# Patient Record
Sex: FEMALE | Race: BLACK OR AFRICAN AMERICAN | Employment: FULL TIME | URBAN - METROPOLITAN AREA
[De-identification: names, ages, dates, MRNs, and addresses within clinical notes are randomized per-mention and may not be internally consistent; named-entity substitution may affect disease eponyms.]

---

## 2019-04-29 ENCOUNTER — APPOINTMENT (OUTPATIENT)
Dept: CT IMAGING | Age: 34
End: 2019-04-29
Attending: PHYSICIAN ASSISTANT
Payer: COMMERCIAL

## 2019-04-29 ENCOUNTER — APPOINTMENT (OUTPATIENT)
Dept: GENERAL RADIOLOGY | Age: 34
End: 2019-04-29
Attending: EMERGENCY MEDICINE
Payer: COMMERCIAL

## 2019-04-29 ENCOUNTER — HOSPITAL ENCOUNTER (EMERGENCY)
Age: 34
Discharge: HOME | End: 2019-04-29
Attending: EMERGENCY MEDICINE
Payer: COMMERCIAL

## 2019-04-29 VITALS
DIASTOLIC BLOOD PRESSURE: 84 MMHG | OXYGEN SATURATION: 100 % | HEIGHT: 64 IN | BODY MASS INDEX: 33.08 KG/M2 | TEMPERATURE: 98.1 F | WEIGHT: 193.78 LBS | HEART RATE: 78 BPM | SYSTOLIC BLOOD PRESSURE: 128 MMHG | RESPIRATION RATE: 16 BRPM

## 2019-04-29 DIAGNOSIS — I30.9 ACUTE PERICARDITIS, UNSPECIFIED TYPE: Primary | ICD-10-CM

## 2019-04-29 DIAGNOSIS — R03.0 ELEVATED BLOOD PRESSURE READING: ICD-10-CM

## 2019-04-29 DIAGNOSIS — I10 ESSENTIAL HYPERTENSION: ICD-10-CM

## 2019-04-29 LAB
ALBUMIN SERPL-MCNC: 3.8 G/DL (ref 3.5–5)
ALBUMIN/GLOB SERPL: 0.9 {RATIO} (ref 1.1–2.2)
ALP SERPL-CCNC: 74 U/L (ref 45–117)
ALT SERPL-CCNC: 20 U/L (ref 12–78)
ANION GAP SERPL CALC-SCNC: 6 MMOL/L (ref 5–15)
AST SERPL-CCNC: 19 U/L (ref 15–37)
BASOPHILS # BLD: 0 K/UL (ref 0–0.1)
BASOPHILS NFR BLD: 0 % (ref 0–1)
BILIRUB SERPL-MCNC: 0.3 MG/DL (ref 0.2–1)
BUN SERPL-MCNC: 14 MG/DL (ref 6–20)
BUN/CREAT SERPL: 22 (ref 12–20)
CALCIUM SERPL-MCNC: 8.5 MG/DL (ref 8.5–10.1)
CHLORIDE SERPL-SCNC: 105 MMOL/L (ref 97–108)
CK SERPL-CCNC: 89 U/L (ref 26–192)
CO2 SERPL-SCNC: 27 MMOL/L (ref 21–32)
CREAT SERPL-MCNC: 0.64 MG/DL (ref 0.55–1.02)
DIFFERENTIAL METHOD BLD: ABNORMAL
EOSINOPHIL # BLD: 0.5 K/UL (ref 0–0.4)
EOSINOPHIL NFR BLD: 5 % (ref 0–7)
ERYTHROCYTE [DISTWIDTH] IN BLOOD BY AUTOMATED COUNT: 18.3 % (ref 11.5–14.5)
GLOBULIN SER CALC-MCNC: 4.4 G/DL (ref 2–4)
GLUCOSE SERPL-MCNC: 68 MG/DL (ref 65–100)
HCG UR QL: NEGATIVE
HCT VFR BLD AUTO: 34.7 % (ref 35–47)
HGB BLD-MCNC: 11.2 G/DL (ref 11.5–16)
IMM GRANULOCYTES # BLD AUTO: 0 K/UL (ref 0–0.04)
IMM GRANULOCYTES NFR BLD AUTO: 0 % (ref 0–0.5)
LYMPHOCYTES # BLD: 4.2 K/UL (ref 0.8–3.5)
LYMPHOCYTES NFR BLD: 42 % (ref 12–49)
MCH RBC QN AUTO: 23.8 PG (ref 26–34)
MCHC RBC AUTO-ENTMCNC: 32.3 G/DL (ref 30–36.5)
MCV RBC AUTO: 73.8 FL (ref 80–99)
MONOCYTES # BLD: 1.1 K/UL (ref 0–1)
MONOCYTES NFR BLD: 11 % (ref 5–13)
NEUTS SEG # BLD: 4.2 K/UL (ref 1.8–8)
NEUTS SEG NFR BLD: 42 % (ref 32–75)
NRBC # BLD: 0 K/UL (ref 0–0.01)
NRBC BLD-RTO: 0 PER 100 WBC
PLATELET # BLD AUTO: 386 K/UL (ref 150–400)
PMV BLD AUTO: 10.5 FL (ref 8.9–12.9)
POTASSIUM SERPL-SCNC: 3.8 MMOL/L (ref 3.5–5.1)
PROT SERPL-MCNC: 8.2 G/DL (ref 6.4–8.2)
RBC # BLD AUTO: 4.7 M/UL (ref 3.8–5.2)
SODIUM SERPL-SCNC: 138 MMOL/L (ref 136–145)
TROPONIN I SERPL-MCNC: <0.05 NG/ML
WBC # BLD AUTO: 10.1 K/UL (ref 3.6–11)

## 2019-04-29 PROCEDURE — 74011636320 HC RX REV CODE- 636/320: Performed by: EMERGENCY MEDICINE

## 2019-04-29 PROCEDURE — 71046 X-RAY EXAM CHEST 2 VIEWS: CPT

## 2019-04-29 PROCEDURE — 84484 ASSAY OF TROPONIN QUANT: CPT

## 2019-04-29 PROCEDURE — 96374 THER/PROPH/DIAG INJ IV PUSH: CPT

## 2019-04-29 PROCEDURE — 85025 COMPLETE CBC W/AUTO DIFF WBC: CPT

## 2019-04-29 PROCEDURE — 36415 COLL VENOUS BLD VENIPUNCTURE: CPT

## 2019-04-29 PROCEDURE — 80053 COMPREHEN METABOLIC PANEL: CPT

## 2019-04-29 PROCEDURE — 71275 CT ANGIOGRAPHY CHEST: CPT

## 2019-04-29 PROCEDURE — 81025 URINE PREGNANCY TEST: CPT

## 2019-04-29 PROCEDURE — 74011250636 HC RX REV CODE- 250/636: Performed by: PHYSICIAN ASSISTANT

## 2019-04-29 PROCEDURE — 93005 ELECTROCARDIOGRAM TRACING: CPT

## 2019-04-29 PROCEDURE — 82550 ASSAY OF CK (CPK): CPT

## 2019-04-29 PROCEDURE — 99285 EMERGENCY DEPT VISIT HI MDM: CPT

## 2019-04-29 RX ORDER — KETOROLAC TROMETHAMINE 30 MG/ML
15 INJECTION, SOLUTION INTRAMUSCULAR; INTRAVENOUS
Status: COMPLETED | OUTPATIENT
Start: 2019-04-29 | End: 2019-04-29

## 2019-04-29 RX ORDER — COLCHICINE 0.6 MG/1
0.6 TABLET ORAL DAILY
Qty: 30 TAB | Refills: 0 | Status: SHIPPED | OUTPATIENT
Start: 2019-04-29 | End: 2019-05-29

## 2019-04-29 RX ORDER — SODIUM CHLORIDE 0.9 % (FLUSH) 0.9 %
10 SYRINGE (ML) INJECTION
Status: COMPLETED | OUTPATIENT
Start: 2019-04-29 | End: 2019-04-29

## 2019-04-29 RX ORDER — PREDNISONE 10 MG/1
TABLET ORAL
Qty: 48 TAB | Refills: 0 | Status: SHIPPED | OUTPATIENT
Start: 2019-04-29 | End: 2019-05-17 | Stop reason: ALTCHOICE

## 2019-04-29 RX ADMIN — KETOROLAC TROMETHAMINE 15 MG: 30 INJECTION, SOLUTION INTRAMUSCULAR at 19:40

## 2019-04-29 RX ADMIN — Medication 10 ML: at 20:51

## 2019-04-29 RX ADMIN — IOPAMIDOL 100 ML: 755 INJECTION, SOLUTION INTRAVENOUS at 20:51

## 2019-04-29 NOTE — ED NOTES
Assumed care of patient. of this patient. She is alert and oriented x4 and placed on monitor x3. Patient presents to ED ambulatory with c/o chest pain for several days. Patient reports that she has hx of endocarditis 2 yrs ago and states the pain is similar. She recently noticed a lump in her left breast and was at [facility] to have evaluated when she informed her provider of the pain. Patient has hx of positive ANAs and is being followed by rhematology after having incident of endocarditis. She completed echo after diagnosis and was cleared by cardiology after treatment.

## 2019-04-29 NOTE — LETTER
[address] 
[facility] EMERGENCY DEPT 
[address] [place] [address] [address] [id number] 
[phone] Work/School Note Date: 4/29/2019 To Whom It May concern: 
 
Marcus Morrison was seen and treated today in the emergency room by the following provider(s): 
Attending Provider: Yaritza Etienne MD 
Physician Assistant: MEÑO Ventura. Marcus Morrison may return to work on 05/02/2019. Sincerely, 
 
 
 
 
Calvin Brown.  Dang Estrada

## 2019-04-30 LAB
ATRIAL RATE: 75 BPM
CALCULATED P AXIS, ECG09: 19 DEGREES
CALCULATED R AXIS, ECG10: 72 DEGREES
CALCULATED T AXIS, ECG11: 67 DEGREES
DIAGNOSIS, 93000: NORMAL
P-R INTERVAL, ECG05: 162 MS
Q-T INTERVAL, ECG07: 376 MS
QRS DURATION, ECG06: 68 MS
QTC CALCULATION (BEZET), ECG08: 419 MS
VENTRICULAR RATE, ECG03: 75 BPM

## 2019-04-30 NOTE — ED PROVIDER NOTES
EMERGENCY DEPARTMENT HISTORY AND PHYSICAL EXAM      Date: 4/29/2019  Patient Name: Deanne Núñez    History of Presenting Illness     Chief Complaint   Patient presents with    Chest Pain     pt reports she felt a lump in left breast, was seen by OB/GYN and is scheduled for a biopsy, reports she has been having pain in her chest x2-3 days, reports she had similar symptoms with a PE       History Provided By: Patient    HPI: Deanne Núñez, 28 y.o. female with PMHx significant for being DWAYNE positive, axillary adenopathy, hypertension, obesity, acute pericarditis and pericardial effusion in 2016, presents ambulatory to the ED with cc of central chest pain for the last 3 to 4 days. Patient states that her chest pain worsens with any sort of movement or coughing. She states that her chest pain is there all the time. She states that she experienced similar symptoms in 2016 was evaluated by cardiology. She was diagnosed with a pericardial effusion and pericarditis and was put on colchicine with cardiology follow-up. Today she went to [facility] [place] for evaluation of a lump in her left breast which she is scheduled to have a biopsy on and they encouraged her to follow-up in the ER for her chest pain given her past medical history. Patient attributed her chest pain to indigestion. She denies any prior viral symptoms, exposure to sick contacts, shortness of breath, nasal congestion, abdominal pain, nausea, vomiting, fever, chills, IV drug use. Patient states she does have an IUD that she is due to get out Monday. PCP: Shiloh SIU MD    There are no other complaints, changes, or physical findings at this time. Current Outpatient Medications   Medication Sig Dispense Refill    colchicine 0.6 mg tablet Take 1 Tab by mouth daily for 30 days. 30 Tab 0    predniSONE (STERAPRED DS) 10 mg dose pack Per dose pack instructions 48 Tab 0    LEVONORGESTREL (MIRENA IU) by IntraUTERine route.       multivitamin (ONE A DAY) tablet Take 1 Tab by mouth daily.  HYDROcodone-acetaminophen (NORCO) 5-325 mg per tablet Take  by mouth as needed for Pain.  ALPRAZolam (XANAX) 0.5 mg tablet Take  by mouth as needed for Anxiety. Past History     Past Medical History:  Past Medical History:   Diagnosis Date    DWAYNE positive     Axillary adenopathy 2/3/2016    Hypertension in pregnancy     Obesity     Pericardial effusion     Pericarditis, acute        Past Surgical History:  Past Surgical History:   Procedure Laterality Date    HX  SECTION         Family History:  Family History   Problem Relation Age of Onset    Hypertension Mother     Hypertension Father     Hypertension Brother        Social History:  Social History     Tobacco Use    Smoking status: Never Smoker    Smokeless tobacco: Never Used   Substance Use Topics    Alcohol use: Yes     Alcohol/week: 0.0 oz    Drug use: Not on file       Allergies:  No Known Allergies      Review of Systems   Review of Systems   Constitutional: Negative. Negative for activity change, appetite change, chills and fever. Eyes: Negative for pain and visual disturbance. Respiratory: Negative for cough, shortness of breath and wheezing. Cardiovascular: Positive for chest pain. Negative for palpitations and leg swelling. Gastrointestinal: Negative for abdominal pain, diarrhea, nausea and vomiting. Genitourinary: Negative for dysuria and hematuria. Musculoskeletal: Negative for arthralgias and myalgias. Skin: Negative for color change, rash and wound. Neurological: Negative for dizziness and headaches. All other systems reviewed and are negative. Physical Exam   Physical Exam   Constitutional: She is oriented to person, place, and time. Vital signs are normal. She appears well-developed and well-nourished. No distress.    28 y.o. female in NAD  Communicates appropriately and in full sentences  Normal vital signs   HENT:   Head: Normocephalic and atraumatic. Mouth/Throat: No oropharyngeal exudate. Eyes: Pupils are equal, round, and reactive to light. Conjunctivae are normal. Right eye exhibits no discharge. Left eye exhibits no discharge. Neck: Normal range of motion. Neck supple. No nuchal rigidity   Cardiovascular: Normal rate, regular rhythm and intact distal pulses. Pulmonary/Chest: Effort normal and breath sounds normal. No respiratory distress. She has no wheezes. Abdominal: Soft. Bowel sounds are normal. She exhibits no distension. There is no tenderness. There is no rebound and no guarding. Musculoskeletal: Normal range of motion. She exhibits no deformity. No neurologic or vascular compromise on exam.    Neurological: She is alert and oriented to person, place, and time. Skin: Skin is warm and dry. She is not diaphoretic. No erythema. Psychiatric: She has a normal mood and affect. Nursing note and vitals reviewed.         Diagnostic Study Results     Labs -     Recent Results (from the past 12 hour(s))   EKG, 12 LEAD, INITIAL    Collection Time: 04/29/19  5:12 PM   Result Value Ref Range    Ventricular Rate 75 BPM    Atrial Rate 75 BPM    P-R Interval 162 ms    QRS Duration 68 ms    Q-T Interval 376 ms    QTC Calculation (Bezet) 419 ms    Calculated P Axis 19 degrees    Calculated R Axis 72 degrees    Calculated T Axis 67 degrees    Diagnosis       Normal sinus rhythm  Low voltage QRS  Nonspecific T wave abnormality  No previous ECGs available     CBC WITH AUTOMATED DIFF    Collection Time: 04/29/19  5:34 PM   Result Value Ref Range    WBC 10.1 3.6 - 11.0 K/uL    RBC 4.70 3.80 - 5.20 M/uL    HGB 11.2 (L) 11.5 - 16.0 g/dL    HCT 34.7 (L) 35.0 - 47.0 %    MCV 73.8 (L) 80.0 - 99.0 FL    MCH 23.8 (L) 26.0 - 34.0 PG    MCHC 32.3 30.0 - 36.5 g/dL    RDW 18.3 (H) 11.5 - 14.5 %    PLATELET 305 457 - 092 K/uL    MPV 10.5 8.9 - 12.9 FL    NRBC 0.0 0  WBC    ABSOLUTE NRBC 0.00 0.00 - 0.01 K/uL    NEUTROPHILS 42 32 - 75 %    LYMPHOCYTES 42 12 - 49 %    MONOCYTES 11 5 - 13 %    EOSINOPHILS 5 0 - 7 %    BASOPHILS 0 0 - 1 %    IMMATURE GRANULOCYTES 0 0.0 - 0.5 %    ABS. NEUTROPHILS 4.2 1.8 - 8.0 K/UL    ABS. LYMPHOCYTES 4.2 (H) 0.8 - 3.5 K/UL    ABS. MONOCYTES 1.1 (H) 0.0 - 1.0 K/UL    ABS. EOSINOPHILS 0.5 (H) 0.0 - 0.4 K/UL    ABS. BASOPHILS 0.0 0.0 - 0.1 K/UL    ABS. IMM. GRANS. 0.0 0.00 - 0.04 K/UL    DF AUTOMATED     METABOLIC PANEL, COMPREHENSIVE    Collection Time: 04/29/19  5:34 PM   Result Value Ref Range    Sodium 138 136 - 145 mmol/L    Potassium 3.8 3.5 - 5.1 mmol/L    Chloride 105 97 - 108 mmol/L    CO2 27 21 - 32 mmol/L    Anion gap 6 5 - 15 mmol/L    Glucose 68 65 - 100 mg/dL    BUN 14 6 - 20 MG/DL    Creatinine 0.64 0.55 - 1.02 MG/DL    BUN/Creatinine ratio 22 (H) 12 - 20      GFR est AA >60 >60 ml/min/1.73m2    GFR est non-AA >60 >60 ml/min/1.73m2    Calcium 8.5 8.5 - 10.1 MG/DL    Bilirubin, total 0.3 0.2 - 1.0 MG/DL    ALT (SGPT) 20 12 - 78 U/L    AST (SGOT) 19 15 - 37 U/L    Alk. phosphatase 74 45 - 117 U/L    Protein, total 8.2 6.4 - 8.2 g/dL    Albumin 3.8 3.5 - 5.0 g/dL    Globulin 4.4 (H) 2.0 - 4.0 g/dL    A-G Ratio 0.9 (L) 1.1 - 2.2     CK W/ REFLX CKMB    Collection Time: 04/29/19  5:34 PM   Result Value Ref Range    CK 89 26 - 192 U/L   TROPONIN I    Collection Time: 04/29/19  5:34 PM   Result Value Ref Range    Troponin-I, Qt. <0.05 <0.05 ng/mL   HCG URINE, QL. - POC    Collection Time: 04/29/19  7:39 PM   Result Value Ref Range    Pregnancy test,urine (POC) NEGATIVE  NEG         Radiologic Studies -   CTA CHEST W OR W WO CONT   Final Result   IMPRESSION:    1. No pulmonary embolus. 2. Pericardial effusion. XR CHEST PA LAT   Final Result   IMPRESSION: Normal chest.           CT Results  (Last 48 hours)               04/29/19 2051  CTA CHEST W OR W WO CONT Final result    Impression:  IMPRESSION:    1. No pulmonary embolus. 2. Pericardial effusion.            Narrative:  EXAM: CT ANGIOGRAPHY CHEST    INDICATION: Chest pain for 2 to 3 days, acute, pulmonary embolism (PE)   suspected; Chest pain, normal EKG; Hx of pericardial effusion and pericarditis. COMPARISON: None. CONTRAST: 80 mL of Isovue-370. TECHNIQUE:    Precontrast  images were obtained to localize the volume for acquisition. Multislice helical CT arteriography was performed from the diaphragm to the   thoracic inlet during uneventful rapid bolus intravenous contrast   administration. Lung and soft tissue windows were generated. Coronal and   sagittal  reformatted images were also generated and 3D post processing   consisting of coronal maximum intensity projection images was performed. CT dose   reduction was achieved through use of a standardized protocol tailored for this   examination and automatic exposure control for dose modulation. FINDINGS:   LOWER NECK: The visualized portions of the thyroid and structures of the lower   neck are within normal limits. LUNGS: The lungs are clear of mass, nodule, airspace disease or edema. PLEURA: There is no pleural effusion or pneumothorax. PULMONARY ARTERIES: The pulmonary arteries are well enhanced and no pulmonary   emboli are identified. AORTA: The aorta enhances normally without evidence of aneurysm or dissection. MEDIASTINUM: There is a pericardial effusion. There is no mediastinal or hilar   adenopathy or mass. BONES AND SOFT TISSUES: The bones and soft tissues of the chest wall are within   normal limits. UPPER ABDOMEN: The visualized portions of the upper abdominal organs are normal.               CXR Results  (Last 48 hours)               04/29/19 1735  XR CHEST PA LAT Final result    Impression:  IMPRESSION: Normal chest.           Narrative:  EXAM:  XR CHEST PA LAT. INDICATION: Chest pain. COMPARISON: None. FINDINGS:    PA and lateral radiographs of the chest were obtained. Lungs:  The lungs are clear of mass, nodule, airspace disease or edema. Pleura: There is no pleural effusion or pneumothorax. Mediastinum: The cardiac and mediastinal contours and pulmonary vascularity are   normal.   Bones and soft tissues: The bones and soft tissues are within normal limits. The   patient is obese. Medical Decision Making   I am the first provider for this patient. I reviewed the vital signs, available nursing notes, past medical history, past surgical history, family history and social history. Vital Signs-Reviewed the patient's vital signs. Patient Vitals for the past 12 hrs:   Temp Pulse Resp BP SpO2   04/29/19 2130 -- 78 16 128/84 100 %   04/29/19 2105 -- -- -- (!) 140/102 --   04/29/19 1900 -- 90 17 (!) 137/93 100 %   04/29/19 1841 -- 86 10 (!) 144/111 100 %   04/29/19 1709 98.1 °F (36.7 °C) 81 16 (!) 151/93 100 %         Records Reviewed: Nursing Notes, Old Medical Records, Previous Radiology Studies and Previous Laboratory Studies    Provider Notes (Medical Decision Making):   Differential diagnosis includes pericardial effusion, pneumonia, PE, costochondritis, rheumatologic condition, bronchitis. 24-year-old patient with past medical history of unknown autoimmune versus rheumatologic condition that is currently being worked up by rheumatology presents ambulatory to the emergency department with complaints of central chest pain for the last 3 to 4 days that worsens with motion or coughing. Patient has a history of similar symptoms and was diagnosed with pericarditis and pericardial effusion that was treated with colchicine. Will evaluate with labs, EKG, x-ray, and CTA of chest. Discussed case with attending - pt is hemodynamically stable. Will discharge with colchicine and course of steroids as well as outpatient cardiology follow-up. Pt expresses understanding. Provided customary ED return precautions. ED Course:   Initial assessment performed.  The patients presenting problems have been discussed, and they are in agreement with the care plan formulated and outlined with them. I have encouraged them to ask questions as they arise throughout their visit. Pt noted to be feeling better. States he/she will follow up as instructed. All questions have been answered, pt voiced understanding and agreement with plan. Specific return precautions provided as well as instructions to return to the ED should sx worsen at any time. Vital signs stable for discharge. DISCHARGE NOTE:  [facility]'s  results have been reviewed with her. She has been counseled regarding her diagnosis. She verbally conveys understanding and agreement of the signs, symptoms, diagnosis, treatment and prognosis and additionally agrees to follow up as recommended with Sade Mills MD in 24 - 48 hours. She also agrees with the care-plan and conveys that all of her questions have been answered. I have also put together some discharge instructions for her that include: 1) educational information regarding their diagnosis, 2) how to care for their diagnosis at home, as well a 3) list of reasons why they would want to return to the ED prior to their follow-up appointment, should their condition change. She and/or family's questions have been answered. I have encouraged them to see the official results in [facility]\" or to retrieve the specifics of their results from medical records. PLAN:  1. Return precautions as discussed  2. Follow-up with providers as directed  3. Medications as prescribed    Return to ED if worse     Diagnosis     Clinical Impression:   1. Acute pericarditis, unspecified type    2. Elevated blood pressure reading    3. Essential hypertension        Discharge Medication List as of 4/29/2019  9:39 PM      START taking these medications    Details   colchicine 0.6 mg tablet Take 1 Tab by mouth daily for 30 days. , Normal, Disp-30 Tab, R-0      predniSONE (STERAPRED DS) 10 mg dose pack Per dose pack instructions, Normal, Disp-48 Tab, R-0         CONTINUE these medications which have NOT CHANGED    Details   LEVONORGESTREL (MIRENA IU) by IntraUTERine route., Historical Med      multivitamin (ONE A DAY) tablet Take 1 Tab by mouth daily. , Historical Med      HYDROcodone-acetaminophen (NORCO) 5-325 mg per tablet Take  by mouth as needed for Pain., Historical Med      ALPRAZolam (XANAX) 0.5 mg tablet Take  by mouth as needed for Anxiety. , Historical Med             Follow-up Information     Follow up With Specialties Details Why Contact Info    Sade Charles MD Internal Medicine Schedule an appointment as soon as possible for a visit in 2 days As needed, If symptoms worsen [address] [facility]  [phone]      [facility] EMERGENCY DEPT Emergency Medicine Go to If symptoms worsen [address] [facility] [address] Sissy Huggins MD Cardiology, Internal Medicine Call today  [phone]                This note will not be viewable in [address]    1:22 PM  I was personally available for consultation in the emergency department. I have reviewed the chart and agree with the documentation recorded by the [facility], including the assessment, treatment plan, and disposition.   Indira Mishra MD

## 2019-04-30 NOTE — ED NOTES
Patient discharged by [facility]. Patient provided with discharge instructions Rx and instructions on follow up care. Patient out of ED ambulatory accompanied by self.

## 2019-04-30 NOTE — DISCHARGE INSTRUCTIONS
Patient Education        Elevated Blood Pressure: Care Instructions  Your Care Instructions    Blood pressure is a measure of how hard the blood pushes against the walls of your arteries. It's normal for blood pressure to go up and down throughout the day. But if it stays up over time, you have high blood pressure. Two numbers tell you your blood pressure. The first number is the systolic pressure. It shows how hard the blood pushes when your heart is pumping. The second number is the diastolic pressure. It shows how hard the blood pushes between heartbeats, when your heart is relaxed and filling with blood. An ideal blood pressure in adults is less than 120/80 (say \"120 over 80\"). High blood pressure is 140/90 or higher. You have high blood pressure if your top number is 140 or higher or your bottom number is 90 or higher, or both. The main test for high blood pressure is simple, fast, and painless. To diagnose high blood pressure, your doctor will test your blood pressure at different times. After testing your blood pressure, your doctor may ask you to test it again when you are home. If you are diagnosed with high blood pressure, you can work with your doctor to make a long-term plan to manage it. Follow-up care is a key part of your treatment and safety. Be sure to make and go to all appointments, and call your doctor if you are having problems. It's also a good idea to know your test results and keep a list of the medicines you take. How can you care for yourself at home? · Do not smoke. Smoking increases your risk for heart attack and stroke. If you need help quitting, talk to your doctor about stop-smoking programs and medicines. These can increase your chances of quitting for good. · Stay at a healthy weight. · Try to limit how much sodium you eat to less than 2,300 milligrams (mg) a day. Your doctor may ask you to try to eat less than 1,500 mg a day. · Be physically active.  Get at least 30 minutes of exercise on most days of the week. Walking is a good choice. You also may want to do other activities, such as running, swimming, cycling, or playing tennis or team sports. · Avoid or limit alcohol. Talk to your doctor about whether you can drink any alcohol. · Eat plenty of fruits, vegetables, and low-fat dairy products. Eat less saturated and total fats. · Learn how to check your blood pressure at home. When should you call for help? Call your doctor now or seek immediate medical care if:  ? · Your blood pressure is much higher than normal (such as 180/110 or higher). ? · You think high blood pressure is causing symptoms such as:  ¨ Severe headache. ¨ Blurry vision. ? Watch closely for changes in your health, and be sure to contact your doctor if:  ? · You do not get better as expected. Where can you learn more? Go to http://elviaEGIDIUM Technologiesanne.info/. Enter K891 in the search box to learn more about \"Elevated Blood Pressure: Care Instructions. \"  Current as of: September 21, 2016  Content Version: 11.4  © 8167-7694 [employer]. Care instructions adapted under license by [employer] (which disclaims liability or warranty for this information). If you have questions about a medical condition or this instruction, always ask your healthcare professional. [address] any warranty or liability for your use of this information. Patient Education        DASH Diet: Care Instructions  Your Care Instructions    The DASH diet is an eating plan that can help lower your blood pressure. DASH stands for Dietary Approaches to Stop Hypertension. Hypertension is high blood pressure. The DASH diet focuses on eating foods that are high in calcium, potassium, and magnesium. These nutrients can lower blood pressure. The foods that are highest in these nutrients are fruits, vegetables, low-fat dairy products, nuts, seeds, and legumes.  But taking calcium, potassium, and magnesium supplements instead of eating foods that are high in those nutrients does not have the same effect. The DASH diet also includes whole grains, fish, and poultry. The DASH diet is one of several lifestyle changes your doctor may recommend to lower your high blood pressure. Your doctor may also want you to decrease the amount of sodium in your diet. Lowering sodium while following the DASH diet can lower blood pressure even further than just the DASH diet alone. Follow-up care is a key part of your treatment and safety. Be sure to make and go to all appointments, and call your doctor if you are having problems. It's also a good idea to know your test results and keep a list of the medicines you take. How can you care for yourself at home? Following the DASH diet  · Eat 4 to 5 servings of fruit each day. A serving is 1 medium-sized piece of fruit, ½ cup chopped or canned fruit, 1/4 cup dried fruit, or 4 ounces (½ cup) of fruit juice. Choose fruit more often than fruit juice. · Eat 4 to 5 servings of vegetables each day. A serving is 1 cup of lettuce or raw leafy vegetables, ½ cup of chopped or cooked vegetables, or 4 ounces (½ cup) of vegetable juice. Choose vegetables more often than vegetable juice. · Get 2 to 3 servings of low-fat and fat-free dairy each day. A serving is 8 ounces of milk, 1 cup of yogurt, or 1 ½ ounces of cheese. · Eat 6 to 8 servings of grains each day. A serving is 1 slice of bread, 1 ounce of dry cereal, or ½ cup of cooked rice, pasta, or cooked cereal. Try to choose whole-grain products as much as possible. · Limit lean meat, poultry, and fish to 2 servings each day. A serving is 3 ounces, about the size of a deck of cards. · Eat 4 to 5 servings of nuts, seeds, and legumes (cooked dried beans, lentils, and split peas) each week. A serving is 1/3 cup of nuts, 2 tablespoons of seeds, or ½ cup of cooked beans or peas.   · Limit fats and oils to 2 to 3 servings each day. A serving is 1 teaspoon of vegetable oil or 2 tablespoons of salad dressing. · Limit sweets and added sugars to 5 servings or less a week. A serving is 1 tablespoon jelly or jam, ½ cup sorbet, or 1 cup of lemonade. · Eat less than 2,300 milligrams (mg) of sodium a day. If you limit your sodium to 1,500 mg a day, you can lower your blood pressure even more. Tips for success  · Start small. Do not try to make dramatic changes to your diet all at once. You might feel that you are missing out on your favorite foods and then be more likely to not follow the plan. Make small changes, and stick with them. Once those changes become habit, add a few more changes. · Try some of the following:  ? Make it a goal to eat a fruit or vegetable at every meal and at snacks. This will make it easy to get the recommended amount of fruits and vegetables each day. ? Try yogurt topped with fruit and nuts for a snack or healthy dessert. ? Add lettuce, tomato, cucumber, and onion to sandwiches. ? Combine a ready-made pizza crust with low-fat mozzarella cheese and lots of vegetable toppings. Try using tomatoes, squash, spinach, broccoli, carrots, cauliflower, and onions. ? Have a variety of cut-up vegetables with a low-fat dip as an appetizer instead of chips and dip. ? Sprinkle sunflower seeds or chopped almonds over salads. Or try adding chopped walnuts or almonds to cooked vegetables. ? Try some vegetarian meals using beans and peas. Add garbanzo or kidney beans to salads. Make burritos and tacos with mashed meek beans or black beans. Where can you learn more? Go to http://elvia-anne.info/. Enter Z125 in the search box to learn more about \"DASH Diet: Care Instructions. \"  Current as of: July 22, 2018  Content Version: 11.9  © 0093-2147 [employer]. Care instructions adapted under license by [employer] (which disclaims liability or warranty for this information).  If you have questions about a medical condition or this instruction, always ask your healthcare professional. [address] any warranty or liability for your use of this information. Patient Education        High Blood Pressure: Care Instructions  Overview    It's normal for blood pressure to go up and down throughout the day. But if it stays up, you have high blood pressure. Another name for high blood pressure is hypertension. Despite what a lot of people think, high blood pressure usually doesn't cause headaches or make you feel dizzy or lightheaded. It usually has no symptoms. But it does increase your risk of stroke, heart attack, and other problems. You and your doctor will talk about your risks of these problems based on your blood pressure. Your doctor will give you a goal for your blood pressure. Your goal will be based on your health and your age. Lifestyle changes, such as eating healthy and being active, are always important to help lower blood pressure. You might also take medicine to reach your blood pressure goal.  Follow-up care is a key part of your treatment and safety. Be sure to make and go to all appointments, and call your doctor if you are having problems. It's also a good idea to know your test results and keep a list of the medicines you take. How can you care for yourself at home? Medical treatment  · If you stop taking your medicine, your blood pressure will go back up. You may take one or more types of medicine to lower your blood pressure. Be safe with medicines. Take your medicine exactly as prescribed. Call your doctor if you think you are having a problem with your medicine. · Talk to your doctor before you start taking aspirin every day. Aspirin can help certain people lower their risk of a heart attack or stroke. But taking aspirin isn't right for everyone, because it can cause serious bleeding. · See your doctor regularly.  You may need to see the doctor more often at first or until your blood pressure comes down. · If you are taking blood pressure medicine, talk to your doctor before you take decongestants or anti-inflammatory medicine, such as ibuprofen. Some of these medicines can raise blood pressure. · Learn how to check your blood pressure at home. Lifestyle changes  · Stay at a healthy weight. This is especially important if you put on weight around the waist. Losing even 10 pounds can help you lower your blood pressure. · If your doctor recommends it, get more exercise. Walking is a good choice. Bit by bit, increase the amount you walk every day. Try for at least 30 minutes on most days of the week. You also may want to swim, bike, or do other activities. · Avoid or limit alcohol. Talk to your doctor about whether you can drink any alcohol. · Try to limit how much sodium you eat to less than 2,300 milligrams (mg) a day. Your doctor may ask you to try to eat less than 1,500 mg a day. · Eat plenty of fruits (such as bananas and oranges), vegetables, legumes, whole grains, and low-fat dairy products. · Lower the amount of saturated fat in your diet. Saturated fat is found in animal products such as milk, cheese, and meat. Limiting these foods may help you lose weight and also lower your risk for heart disease. · Do not smoke. Smoking increases your risk for heart attack and stroke. If you need help quitting, talk to your doctor about stop-smoking programs and medicines. These can increase your chances of quitting for good. When should you call for help? Call 911 anytime you think you may need emergency care. This may mean having symptoms that suggest that your blood pressure is causing a serious heart or blood vessel problem. Your blood pressure may be over 180/120.   For example, call 911 if:    · You have symptoms of a heart attack. These may include:  ? Chest pain or pressure, or a strange feeling in the chest.  ? Sweating. ?  Shortness of breath. ? Nausea or vomiting. ? Pain, pressure, or a strange feeling in the back, neck, jaw, or upper belly or in one or both shoulders or arms. ? Lightheadedness or sudden weakness. ? A fast or irregular heartbeat.     · You have symptoms of a stroke. These may include:  ? Sudden numbness, tingling, weakness, or loss of movement in your face, arm, or leg, especially on only one side of your body. ? Sudden vision changes. ? Sudden trouble speaking. ? Sudden confusion or trouble understanding simple statements. ? Sudden problems with walking or balance. ? A sudden, severe headache that is different from past headaches.     · You have severe back or belly pain.    Do not wait until your blood pressure comes down on its own. Get help right away.   Call your doctor now or seek immediate care if:    · Your blood pressure is much higher than normal (such as 180/120 or higher), but you don't have symptoms.     · You think high blood pressure is causing symptoms, such as:  ? Severe headache.  ? Blurry vision.    Watch closely for changes in your health, and be sure to contact your doctor if:    · Your blood pressure measures higher than your doctor recommends at least 2 times. That means the top number is higher or the bottom number is higher, or both.     · You think you may be having side effects from your blood pressure medicine. Where can you learn more? Go to http://elvia-anne.info/. Enter M875 in the search box to learn more about \"High Blood Pressure: Care Instructions. \"  Current as of: July 22, 2018  Content Version: 11.9  © 9606-5194 [employer]. Care instructions adapted under license by [employer] (which disclaims liability or warranty for this information).  If you have questions about a medical condition or this instruction, always ask your healthcare professional. [place] [address] any warranty or liability for your use of this information. Patient Education        Pericarditis: Care Instructions  Your Care Instructions    Pericarditis occurs when the membrane that surrounds the heart and its major blood vessels becomes inflamed. In most cases, the cause is not known. It can be caused by a virus, a heart attack, or a chest injury. It also can be caused by another type of illness. Pericarditis causes sharp chest pain. This pain gets worse when you lie down or take a deep breath. The pain gets better if you lean forward or sit up. Pericarditis often heals on its own. It usually does not cause any more problems. Most people get better within a couple of weeks. Follow-up care is a key part of your treatment and safety. Be sure to make and go to all appointments, and call your doctor if you are having problems. It's also a good idea to know your test results and keep a list of the medicines you take. How can you care for yourself at home? · Watch for the return of your symptoms. Sometimes pericarditis can come back after it has gone away. · Be safe with medicines. Take your medicines exactly as prescribed. Call your doctor if you think you are having a problem with your medicine. · Ask your doctor if you can take an over-the-counter pain medicine, such as acetaminophen (Tylenol), ibuprofen (Advil, Motrin), or naproxen (Aleve). Read and follow all instructions on the label. · Do not take two or more pain medicines at the same time unless the doctor told you to. Many pain medicines have acetaminophen, which is Tylenol. Too much acetaminophen (Tylenol) can be harmful. · Get plenty of rest until you feel better, especially if you have a fever. · Avoid exercise and strenuous activity that has not been approved by your doctor. Ask your doctor when you can be active again. When should you call for help? Call 911 anytime you think you may need emergency care.  For example, call if:    · You have severe trouble breathing.     · You passed out (lost consciousness).    Call your doctor now or seek immediate medical care if:    · You are dizzy or lightheaded, or you feel like you may faint.     · You have trouble breathing.     · You have a new or higher fever.    Watch closely for changes in your health, and be sure to contact your doctor if:    · You do not get better as expected. Where can you learn more? Go to http://elvia-anne.info/. Enter 266 0692 in the search box to learn more about \"Pericarditis: Care Instructions. \"  Current as of: July 22, 2018  Content Version: 11.9  © 7586-7117 [employer]). Care instructions adapted under license by [facility] (which disclaims liability or warranty for this information). If you have questions about a medical condition or this instruction, always ask your healthcare professional. [address] any warranty or liability for your use of this information.

## 2019-05-17 ENCOUNTER — OFFICE VISIT (OUTPATIENT)
Dept: CARDIOLOGY CLINIC | Age: 34
End: 2019-05-17

## 2019-05-17 VITALS
RESPIRATION RATE: 16 BRPM | SYSTOLIC BLOOD PRESSURE: 134 MMHG | OXYGEN SATURATION: 96 % | DIASTOLIC BLOOD PRESSURE: 82 MMHG | HEART RATE: 86 BPM | WEIGHT: 191 LBS | BODY MASS INDEX: 32.61 KG/M2 | HEIGHT: 64 IN

## 2019-05-17 DIAGNOSIS — I31.9 PERICARDITIS WITH EFFUSION: Primary | ICD-10-CM

## 2019-05-17 RX ORDER — HYDROXYCHLOROQUINE SULFATE 200 MG/1
200 TABLET, FILM COATED ORAL 2 TIMES DAILY
Refills: 1 | COMMUNITY
Start: 2019-05-03

## 2019-05-17 RX ORDER — CHOLECALCIFEROL (VITAMIN D3) 125 MCG
5000 CAPSULE ORAL DAILY
COMMUNITY

## 2019-05-17 NOTE — PROGRESS NOTES
Pablo Hill is a 28 y.o. female is here for cardiac evaluation--sx of chest pain. PMHx significant for being DWAYNE positive, axillary adenopathy, hypertension, obesity, acute pericarditis and pericardial effusion in 2016, presents ambulatory to the ED with cc of central chest pain. Patient states that her chest pain worsens with any sort of movement or coughing. She states that her chest pain is there all the time. She states that she experienced similar symptoms in 2016 and had seen previously by me at that time. She was diagnosed with a pericardial effusion and pericarditis (idiopathic, ?viral) and was put on colchicine with  follow-up and resolution. Recently  went to [facility] [place] for evaluation of a lump in her left breast which she is scheduled to have a biopsy. Had chest pain--more indigestion/GERD type sx and seen in ER--w/u in ER with chest CTA--small pericardial effusion present, otherwise neg. rx'd colchicine, sx have completely resolved. Has f/u appt with her Rheumatologist. The patient denies current chest pain/ shortness of breath, orthopnea, PND, LE edema, palpitations, syncope, presyncope or fatigue.        Patient Active Problem List    Diagnosis Date Noted    Axillary adenopathy, left 2016    Lymphadenopathy 10/28/2015    Pericarditis with effusion 10/16/2015    Gestational HTN 10/16/2015    Obesity 10/16/2015    DWAYNE positive 10/16/2015      Sade Charles MD  Past Medical History:   Diagnosis Date    DWAYNE positive     Axillary adenopathy 2/3/2016    Hypertension in pregnancy     Obesity     Pericardial effusion     Pericarditis, acute       Past Surgical History:   Procedure Laterality Date    HX  SECTION       No Known Allergies   Family History   Problem Relation Age of Onset    Hypertension Mother     Hypertension Father     Hypertension Brother       Social History     Socioeconomic History    Marital status: SINGLE     Spouse name: Not on file    Number of children: Not on file    Years of education: Not on file    Highest education level: Not on file   Occupational History    Not on file   Social Needs    Financial resource strain: Not on file    Food insecurity:     Worry: Not on file     Inability: Not on file    Transportation needs:     Medical: Not on file     Non-medical: Not on file   Tobacco Use    Smoking status: Never Smoker    Smokeless tobacco: Never Used   Substance and Sexual Activity    Alcohol use: Yes     Alcohol/week: 0.0 oz    Drug use: Not on file    Sexual activity: Not on file   Lifestyle    Physical activity:     Days per week: Not on file     Minutes per session: Not on file    Stress: Not on file   Relationships    Social connections:     Talks on phone: Not on file     Gets together: Not on file     Attends Jew service: Not on file     Active member of club or organization: Not on file     Attends meetings of clubs or organizations: Not on file     Relationship status: Not on file    Intimate partner violence:     Fear of current or ex partner: Not on file     Emotionally abused: Not on file     Physically abused: Not on file     Forced sexual activity: Not on file   Other Topics Concern    Not on file   Social History Narrative    Not on file      Current Outpatient Medications   Medication Sig    hydroxychloroquine (PLAQUENIL) 200 mg tablet Take 200 mg by mouth two (2) times a day.  cholecalciferol, vitamin D3, (VITAMIN D3) 2,000 unit tab Take 5,000 Units by mouth daily.  colchicine 0.6 mg tablet Take 1 Tab by mouth daily for 30 days.  LEVONORGESTREL (MIRENA IU) by IntraUTERine route.  multivitamin (ONE A DAY) tablet Take 1 Tab by mouth daily.  HYDROcodone-acetaminophen (NORCO) 5-325 mg per tablet Take  by mouth as needed for Pain.  ALPRAZolam (XANAX) 0.5 mg tablet Take  by mouth as needed for Anxiety. No current facility-administered medications for this visit. Review of Symptoms:    CONST  No weight change. No fever, chills, sweats    ENT No visual changes, URI sx, sore throat    CV  See HPI   RESP  No cough, or sputum, wheezing. Also see HPI   GI  No abdominal pain or change in bowel habits. No heartburn or dysphagia. No melena or rectal bleeding.   No dysuria, urgency, frequency, hematuria   MSKEL  No joint pain, swelling. No muscle pain. SKIN  No rash or lesions. NEURO  No headache, syncope, or seizure. No weakness, loss of sensation, or paresthesias. PSYCH  No low mood or depression  No anxiety. HE/LYMPH  No easy bruising, abnormal bleeding, or enlarged glands. Physical ExamPhysical Exam:    Visit Vitals  /82 (BP 1 Location: Right arm, BP Patient Position: Sitting)   Pulse 86   Resp 16   Ht 5' 4\" (1.626 m)   Wt 191 lb (86.6 kg)   SpO2 96%   BMI 32.79 kg/m²     Gen: NAD  HEENT:  PERRL, throat clear  Neck: no adenopathy, no thyromegaly, no JVD   Heart:  Regular,Nl S1S2,  no murmur, gallop or rub.   Lungs:  clear  Abdomen:   Soft, non-tender, bowel sounds are active.   Extremities:  No edema  Pulse: symmetric  Neuro: A&O times 3, No focal neuro deficits    Cardiographics    ECG: NSR, PRWP, NST, no acute changes    Labs:   Lab Results   Component Value Date/Time    Sodium 138 04/29/2019 05:34 PM    Potassium 3.8 04/29/2019 05:34 PM    Chloride 105 04/29/2019 05:34 PM    CO2 27 04/29/2019 05:34 PM    Anion gap 6 04/29/2019 05:34 PM    Glucose 68 04/29/2019 05:34 PM    BUN 14 04/29/2019 05:34 PM    Creatinine 0.64 04/29/2019 05:34 PM    BUN/Creatinine ratio 22 (H) 04/29/2019 05:34 PM    GFR est AA >60 04/29/2019 05:34 PM    GFR est non-AA >60 04/29/2019 05:34 PM    Calcium 8.5 04/29/2019 05:34 PM    Bilirubin, total 0.3 04/29/2019 05:34 PM    AST (SGOT) 19 04/29/2019 05:34 PM    Alk.  phosphatase 74 04/29/2019 05:34 PM    Protein, total 8.2 04/29/2019 05:34 PM    Albumin 3.8 04/29/2019 05:34 PM    Globulin 4.4 (H) 04/29/2019 05:34 PM A-G Ratio 0.9 (L) 04/29/2019 05:34 PM    ALT (SGPT) 20 04/29/2019 05:34 PM     No results found for: CPK, CPKX, CPX  No results found for: CHOL, CHOLX, CHLST, CHOLV, 189056, HDL, LDL, LDLC, DLDLP, TGLX, TRIGL, TRIGP, CHHD, CHHDX  No results found for this or any previous visit. Assessment:         Patient Active Problem List    Diagnosis Date Noted    Axillary adenopathy, left 02/03/2016    Lymphadenopathy 10/28/2015    Pericarditis with effusion 10/16/2015    Gestational HTN 10/16/2015    Obesity 10/16/2015    DWAYNE positive 10/16/2015     28 y.o. female is here for cardiac evaluation--sx of chest pain. PMHx significant for being DWAYNE positive, axillary adenopathy, hypertension, obesity, acute pericarditis and pericardial effusion in 2016, presents ambulatory to the ED with cc of central chest pain. Patient states that her chest pain worsens with any sort of movement or coughing. She states that her chest pain is there all the time. She states that she experienced similar symptoms in 2016 and had seen previously by me at that time. She was diagnosed with a pericardial effusion and pericarditis (idiopathic, ?viral) and was put on colchicine with  follow-up and resolution. Recently  went to [facility]'s [place] for evaluation of a lump in her left breast which she is scheduled to have a biopsy. Had chest pain--more indigestion/GERD type sx and seen in ER--w/u in ER with chest CTA--small pericardial effusion present, otherwise neg. rx'd colchicine, sx have completely resolved.   Has f/u appt with her Rheumatologist.      Plan:     Finish colchicine rx  Echo/doppler--call w/ results  F/u with PCP and Rheumatology as planned    Pallavi Burr MD

## 2019-05-17 NOTE — PROGRESS NOTES
1. Have you been to the ER, urgent care clinic since your last visit? Hospitalized since your last visit? [facility].     2. Have you seen or consulted any other health care providers outside of the [address] [place] [address] since your last visit? Include any pap smears or colon screening. YES, [facility]. [address]

## 2019-06-07 ENCOUNTER — TELEPHONE (OUTPATIENT)
Dept: CARDIOLOGY CLINIC | Age: 34
End: 2019-06-07

## 2019-06-07 NOTE — TELEPHONE ENCOUNTER
Message left on office answering machine.     Please call patient she is starting to have pain again like she has fluid wants to know if she should start taking     Colchicine     [phone]    Thanks  Jovani Burns

## 2019-06-07 NOTE — TELEPHONE ENCOUNTER
Advised that she should go to the ER if chest pain is worsening. Echocardiogram will give Dr. Letitia Harris more information to go by (scheduled for 6/11)  Advised that she proceed with that study.

## 2019-06-11 ENCOUNTER — TELEPHONE (OUTPATIENT)
Dept: CARDIOLOGY CLINIC | Age: 34
End: 2019-06-11

## 2019-06-11 NOTE — TELEPHONE ENCOUNTER
----- Message from Bandar Juarez MD sent at 6/11/2019 12:49 PM EDT -----  Regarding: echo  Advise echo shows very small amount of pericardial fluid--no concerns (finishing rx for pericarditis); echo otherwise ok--normal LV pumping function, valves ok.   Thanks [facility]

## 2019-06-12 RX ORDER — COLCHICINE 0.6 MG/1
0.6 TABLET ORAL DAILY
Qty: 30 TAB | Refills: 2 | Status: SHIPPED | OUTPATIENT
Start: 2019-06-12 | End: 2022-07-14

## 2019-06-12 NOTE — TELEPHONE ENCOUNTER
Per Dr. Lucio Chand:  echo shows very small amount of pericardial fluid--no concerns. echo otherwise ok--normal LV pumping function, valves ok. If still having sx, favor colchicine 0.6mg every day for 3 mos (longer rx to prevent recurrence). Should also discuss with Rheumatology. [facility]     Verbal order per Dr. Lucio Chand. Order (medication, dose, route, frequency, amount, refills) repeated and verified twice. Spoke with the patient. Verified patient with two patient identifiers. Results given and questions answered. Pt asked that the RX be sent but she will call the Rheumatologist before picking up the rx. Pt reports the the pain is subsiding.   Patient verbalized understanding.    (CALLED IN THE RX DUE TO E-SCRIBE ERROR.)

## 2019-07-01 ENCOUNTER — TELEPHONE (OUTPATIENT)
Dept: CARDIOLOGY CLINIC | Age: 34
End: 2019-07-01

## 2019-07-01 NOTE — TELEPHONE ENCOUNTER
Patient needs cardiac clearance for a Tubal Ligation procedure being done by Dr. Villalba Doctor.   Please call [phone]

## 2019-07-02 NOTE — TELEPHONE ENCOUNTER
Per Dr. Stephanie Berkowitz:          Carla Trejo for planned procedure at low cardiac risk. [facility]     L/M for the pt regarding.

## 2020-03-16 ENCOUNTER — OFFICE VISIT (OUTPATIENT)
Dept: CARDIOLOGY CLINIC | Age: 35
End: 2020-03-16

## 2020-03-16 VITALS
DIASTOLIC BLOOD PRESSURE: 88 MMHG | HEART RATE: 78 BPM | SYSTOLIC BLOOD PRESSURE: 126 MMHG | TEMPERATURE: 98.1 F | BODY MASS INDEX: 34.15 KG/M2 | HEIGHT: 64 IN | RESPIRATION RATE: 14 BRPM | WEIGHT: 200 LBS | OXYGEN SATURATION: 100 %

## 2020-03-16 DIAGNOSIS — I31.9 PERICARDITIS WITH EFFUSION: Primary | ICD-10-CM

## 2020-03-16 DIAGNOSIS — R76.8 ANA POSITIVE: ICD-10-CM

## 2020-03-16 DIAGNOSIS — M35.00 SJOGREN'S SYNDROME, WITH UNSPECIFIED ORGAN INVOLVEMENT (HCC): ICD-10-CM

## 2020-03-16 NOTE — LETTER
3/16/20 Patient: Pankaj Aburto YOB: 1985 Date of Visit: 3/16/2020 Jovanna Mcgee MD 
[address] [facility] [address] [id number] VIA Facsimile: [phone] Dear Jovanna Mcgee MD, Thank you for referring Ms. Pankaj Aburto to [facility] for evaluation. My notes for this consultation are attached. If you have questions, please do not hesitate to call me. I look forward to following your patient along with you.  
 
 
Sincerely, 
 
Jose Deluca MD

## 2020-03-16 NOTE — PROGRESS NOTES
Verified patient with two patient identifiers. Medications reviewed/approved by Dr. Shaye Hawley. 1. Have you been to the ER, urgent care clinic since your last visit? Hospitalized since your last visit?no    2. Have you seen or consulted any other health care providers outside of the [address] [place] [address] since your last visit? Include any pap smears or colon screening. Yes, Dr. Shon Morrison - seen since last visit for Pericardial Effusion.  Dr. Ro Bailey - rheumatologist.

## 2020-03-16 NOTE — PATIENT INSTRUCTIONS
Continue indomethacin 25mg three times a day for total 10 days, then taper to 25 twice a day x 5 days, then 12.5 mg twice daily  x 5 days, then d/c.

## 2020-03-16 NOTE — PROGRESS NOTES
Gwendolyn Turner is a 27 y.o. female is here for symptom-based f/u--right anterior chest pain. PMHx significant for being DWAYNE positive, Sjogren's, axillary adenopathy, hypertension, obesity, acute pericarditis and pericardial effusion in , previously rx'd colchicine. Seen 3/13/20 by Devin Lovell NP, EKG with NSR, NST, no acute changes. Repeat Chest CTA 3/13/20 with no evidence pulm embolus, mod sized pericardial effusion UNCHANGED from previous. Has f/u planned with Rheumatology. Rx'd Indomethacin 25mg tid--sx improved considerably. No F, chills, cough/sputum, hemoptysis, dyspnea. The patient denies shortness of breath, orthopnea, PND, LE edema, palpitations, syncope, presyncope or fatigue.        Patient Active Problem List    Diagnosis Date Noted    Sjogren's syndrome ([address] 2020    Axillary adenopathy, left 2016    Lymphadenopathy 10/28/2015    Pericarditis with effusion 10/16/2015    Gestational HTN 10/16/2015    Obesity 10/16/2015    DWAYNE positive 10/16/2015      Sade Charles MD  Past Medical History:   Diagnosis Date    DWAYNE positive     Axillary adenopathy 2/3/2016    Hypertension in pregnancy     Obesity     Pericardial effusion     Pericarditis, acute       Past Surgical History:   Procedure Laterality Date    HX  SECTION       No Known Allergies   Family History   Problem Relation Age of Onset    Hypertension Mother     Hypertension Father     Hypertension Brother       Social History     Socioeconomic History    Marital status: SINGLE     Spouse name: Not on file    Number of children: Not on file    Years of education: Not on file    Highest education level: Not on file   Occupational History    Not on file   Social Needs    Financial resource strain: Not on file    Food insecurity     Worry: Not on file     Inability: Not on file    Transportation needs     Medical: Not on file     Non-medical: Not on file   Tobacco Use    Smoking status: Never Smoker    Smokeless tobacco: Never Used   Substance and Sexual Activity    Alcohol use: Yes     Alcohol/week: 0.0 standard drinks    Drug use: Not on file    Sexual activity: Not on file   Lifestyle    Physical activity     Days per week: Not on file     Minutes per session: Not on file    Stress: Not on file   Relationships    Social connections     Talks on phone: Not on file     Gets together: Not on file     Attends [employer] service: Not on file     Active member of club or organization: Not on file     Attends meetings of clubs or organizations: Not on file     Relationship status: Not on file    Intimate partner violence     Fear of current or ex partner: Not on file     Emotionally abused: Not on file     Physically abused: Not on file     Forced sexual activity: Not on file   Other Topics Concern    Not on file   Social History Narrative    Not on file      Current Outpatient Medications   Medication Sig    INDOMETHACIN PO Take  by mouth three (3) times daily.  hydroxychloroquine (PLAQUENIL) 200 mg tablet Take 200 mg by mouth two (2) times a day.  cholecalciferol, vitamin D3, (VITAMIN D3) 2,000 unit tab Take 5,000 Units by mouth daily.  LEVONORGESTREL (MIRENA IU) by IntraUTERine route.  multivitamin (ONE A DAY) tablet Take 1 Tab by mouth daily.  colchicine 0.6 mg tablet Take 1 Tab by mouth daily.  HYDROcodone-acetaminophen (NORCO) 5-325 mg per tablet Take  by mouth as needed for Pain.  ALPRAZolam (XANAX) 0.5 mg tablet Take  by mouth as needed for Anxiety. No current facility-administered medications for this visit. Review of Symptoms:    CONST  No weight change. No fever, chills, sweats    ENT No visual changes, URI sx, sore throat    CV  See HPI   RESP  No cough, or sputum, wheezing. Also see HPI   GI  No abdominal pain or change in bowel habits. No heartburn or dysphagia. No melena or rectal bleeding.       No dysuria, urgency, frequency, hematuria   MSKEL No joint pain, swelling. No muscle pain. SKIN  No rash or lesions. NEURO  No headache, syncope, or seizure. No weakness, loss of sensation, or paresthesias. PSYCH  No low mood or depression  No anxiety. HE/LYMPH  No easy bruising, abnormal bleeding, or enlarged glands. Physical ExamPhysical Exam:    Visit Vitals  /88 (BP 1 Location: Right arm, BP Patient Position: Sitting)   Pulse 78   Temp 98.1 °F (36.7 °C)   Resp 14   Ht 5' 4\" (1.626 m)   Wt 200 lb (90.7 kg)   SpO2 100% Comment: ra   BMI 34.33 kg/m²     Gen: NAD  HEENT:  PERRL, throat clear  Neck: no adenopathy, no thyromegaly, no JVD   Heart:  Regular,Nl S1S2,  no murmur, gallop or rub.   Lungs:  clear  Abdomen:   Soft, non-tender, bowel sounds are active.   Extremities:  No edema  Pulse: symmetric  Neuro: A&O times 3, No focal neuro deficits    Cardiographics    ECG: from 3/13/20 [facility]--NSR, NSST        Labs:   Lab Results   Component Value Date/Time    Sodium 138 04/29/2019 05:34 PM    Potassium 3.8 04/29/2019 05:34 PM    Chloride 105 04/29/2019 05:34 PM    CO2 27 04/29/2019 05:34 PM    Anion gap 6 04/29/2019 05:34 PM    Glucose 68 04/29/2019 05:34 PM    BUN 14 04/29/2019 05:34 PM    Creatinine 0.64 04/29/2019 05:34 PM    BUN/Creatinine ratio 22 (H) 04/29/2019 05:34 PM    GFR est AA >60 04/29/2019 05:34 PM    GFR est non-AA >60 04/29/2019 05:34 PM    Calcium 8.5 04/29/2019 05:34 PM    Bilirubin, total 0.3 04/29/2019 05:34 PM    AST (SGOT) 19 04/29/2019 05:34 PM    Alk.  phosphatase 74 04/29/2019 05:34 PM    Protein, total 8.2 04/29/2019 05:34 PM    Albumin 3.8 04/29/2019 05:34 PM    Globulin 4.4 (H) 04/29/2019 05:34 PM    A-G Ratio 0.9 (L) 04/29/2019 05:34 PM    ALT (SGPT) 20 04/29/2019 05:34 PM         Assessment:         Patient Active Problem List    Diagnosis Date Noted    [facility] 03/16/2020    Axillary adenopathy, left 02/03/2016    Lymphadenopathy 10/28/2015    Pericarditis with effusion 10/16/2015  Gestational HTN 10/16/2015    Obesity 10/16/2015    DWAYNE positive 10/16/2015     PMHx significant for being DWAYNE positive, Sjogren's, axillary adenopathy, hypertension, obesity, acute pericarditis and pericardial effusion in 2016, previously rx'd colchicine. Seen 3/13/20 by Nader Valderrama NP, EKG with NSR, NST, no acute changes. Repeat Chest CTA 3/13/20 with no evidence pulm embolus, mod sized pericardial effusion UNCHANGED from previous. Has f/u planned with Rheumatology. Rx'd Indomethacin 25mg tid--sx improved considerably. No F, chills, cough/sputum, hemoptysis, dyspnea.      Plan:     Echo/doppler--call w/ results    Continue indomethacin 25mg tid for total 10 days, then taper to 25 bid x 5 days, then 12.5mg bid x 5 days, then d/c  F/u with Rheum as planned  Lorne Bell MD

## 2020-03-23 ENCOUNTER — TELEPHONE (OUTPATIENT)
Dept: CARDIOLOGY CLINIC | Age: 35
End: 2020-03-23

## 2020-03-23 NOTE — TELEPHONE ENCOUNTER
Per Dr. Betsey Medina:  Let's order this as \"stat\" so we can recheck for pericardial effusion. [facility]     L/M regarding for [facility] [place] to schedule STAT.

## 2020-03-23 NOTE — TELEPHONE ENCOUNTER
Shruti Postal from [facility] called because this patinet was going to be scheduled for her Echo that you ordered for End of May first of June unless you think it needs to be done now. If that is the case please put this order in as STAT and they will get her schedule now.   Please call Tyler Merlos at [phone] to let he know what we plan to do

## 2020-03-30 ENCOUNTER — TELEPHONE (OUTPATIENT)
Dept: CARDIOLOGY CLINIC | Age: 35
End: 2020-03-30

## 2020-03-30 NOTE — TELEPHONE ENCOUNTER
Pt called to get results of the Echo and a refill on indomethacin sent to [facility] in [place].   Any questions please at  [phone]

## 2020-03-31 NOTE — TELEPHONE ENCOUNTER
She called back about her results and the prescription just a bit agon and left a VM.   Please call [phone]

## 2020-03-31 NOTE — TELEPHONE ENCOUNTER
Spoke with the patient. Verified patient with two patient identifiers. Results given and questions answered. Pt did finish the indomethacin last week. PT said that the chest pain and sob is \"not consistent\". Advised that I will let Dr. Smith Sons that she is still having intermittent sx's. Patient verbalized understanding.

## 2020-03-31 NOTE — TELEPHONE ENCOUNTER
Per Dr. Alfaro Argue:  Advise normal,structure and function/valves,  no pericardial effusion seen.  If symptoms have resolved she can stop the indomethacin but taper. Diann Georges [facility] Thanks [facility]

## 2020-04-01 RX ORDER — INDOMETHACIN 25 MG/1
25 CAPSULE ORAL 3 TIMES DAILY
Qty: 30 CAP | Refills: 0 | Status: SHIPPED | OUTPATIENT
Start: 2020-04-01 | End: 2020-04-11

## 2020-04-01 NOTE — TELEPHONE ENCOUNTER
Per Dr. Sonia Rutherford:    If still having sx let's do another 10 day course of indomethacin. [facility] [place]

## 2022-03-19 PROBLEM — M35.00 SJOGREN'S SYNDROME (HCC): Status: ACTIVE | Noted: 2020-03-16

## 2022-06-16 ENCOUNTER — HOSPITAL ENCOUNTER (OUTPATIENT)
Dept: CT IMAGING | Age: 37
Discharge: HOME | End: 2022-06-16
Attending: NURSE PRACTITIONER
Payer: COMMERCIAL

## 2022-06-16 ENCOUNTER — TRANSCRIBE ORDER (OUTPATIENT)
Dept: SCHEDULING | Age: 37
End: 2022-06-16

## 2022-06-16 DIAGNOSIS — I31.39 PERICARDIAL EFFUSION (NONINFLAMMATORY): Primary | ICD-10-CM

## 2022-06-16 DIAGNOSIS — R05.9 COUGH: ICD-10-CM

## 2022-06-16 PROCEDURE — 71260 CT THORAX DX C+: CPT

## 2022-06-16 PROCEDURE — 74011000636 HC RX REV CODE- 636

## 2022-06-16 RX ADMIN — IOPAMIDOL 100 ML: 612 INJECTION, SOLUTION INTRAVENOUS at 08:58

## 2022-06-22 ENCOUNTER — HOSPITAL ENCOUNTER (OUTPATIENT)
Dept: ULTRASOUND IMAGING | Age: 37
Discharge: HOME | End: 2022-06-22
Attending: NURSE PRACTITIONER
Payer: COMMERCIAL

## 2022-06-22 DIAGNOSIS — I31.39 PERICARDIAL EFFUSION (NONINFLAMMATORY): ICD-10-CM

## 2022-06-22 PROCEDURE — 93306 TTE W/DOPPLER COMPLETE: CPT

## 2022-06-25 LAB
ECHO AO ROOT DIAM: 2.8 CM
ECHO AV PEAK GRADIENT: 7 MMHG
ECHO AV PEAK VELOCITY: 1.4 M/S
ECHO EST RA PRESSURE: 10 MMHG
ECHO LA DIAMETER: 3.8 CM
ECHO LA TO AORTIC ROOT RATIO: 1.36
ECHO LA VOL 2C: 44 ML (ref 22–52)
ECHO LA VOL 4C: 33 ML (ref 22–52)
ECHO LA VOLUME AREA LENGTH: 41 ML
ECHO LV E' SEPTAL VELOCITY: 8 CM/S
ECHO LV FRACTIONAL SHORTENING: 36 % (ref 28–44)
ECHO LV INTERNAL DIMENSION DIASTOLIC: 4.4 CM (ref 3.9–5.3)
ECHO LV INTERNAL DIMENSION SYSTOLIC: 2.8 CM
ECHO LV IVSD: 0.7 CM (ref 0.6–0.9)
ECHO LV MASS 2D: 109.4 G (ref 67–162)
ECHO LV POSTERIOR WALL DIASTOLIC: 0.9 CM (ref 0.6–0.9)
ECHO LV RELATIVE WALL THICKNESS RATIO: 0.41
ECHO LVOT AREA: 3.1 CM2
ECHO LVOT DIAM: 2 CM
ECHO MV A VELOCITY: 0.53 M/S
ECHO MV E DECELERATION TIME (DT): 180.5 MS
ECHO MV E VELOCITY: 0.86 M/S
ECHO MV E/A RATIO: 1.62
ECHO MV E/E' SEPTAL: 10.75
ECHO PERICARDIUM LATERAL DIMENSION: 0.9 CM
ECHO PERICARDIUM POSTERIOR DIMENSION: 0.9 CM
ECHO RIGHT VENTRICULAR SYSTOLIC PRESSURE (RVSP): 26 MMHG
ECHO TV REGURGITANT MAX VELOCITY: 2 M/S
ECHO TV REGURGITANT PEAK GRADIENT: 16 MMHG

## 2022-06-25 PROCEDURE — 93306 TTE W/DOPPLER COMPLETE: CPT | Performed by: INTERNAL MEDICINE

## 2022-07-14 ENCOUNTER — OFFICE VISIT (OUTPATIENT)
Dept: CARDIOLOGY CLINIC | Age: 37
End: 2022-07-14
Payer: COMMERCIAL

## 2022-07-14 VITALS
BODY MASS INDEX: 34.66 KG/M2 | SYSTOLIC BLOOD PRESSURE: 112 MMHG | RESPIRATION RATE: 18 BRPM | WEIGHT: 203 LBS | TEMPERATURE: 97.6 F | HEIGHT: 64 IN | HEART RATE: 82 BPM | DIASTOLIC BLOOD PRESSURE: 74 MMHG | OXYGEN SATURATION: 99 %

## 2022-07-14 DIAGNOSIS — I31.9 PERICARDITIS WITH EFFUSION: Primary | ICD-10-CM

## 2022-07-14 DIAGNOSIS — R76.8 ANA POSITIVE: ICD-10-CM

## 2022-07-14 DIAGNOSIS — M35.00 SJOGREN'S SYNDROME, WITH UNSPECIFIED ORGAN INVOLVEMENT (HCC): ICD-10-CM

## 2022-07-14 PROCEDURE — 99214 OFFICE O/P EST MOD 30 MIN: CPT | Performed by: NURSE PRACTITIONER

## 2022-07-14 PROCEDURE — 93000 ELECTROCARDIOGRAM COMPLETE: CPT | Performed by: NURSE PRACTITIONER

## 2022-07-14 RX ORDER — INDOMETHACIN 25 MG/1
25 CAPSULE ORAL 2 TIMES DAILY WITH MEALS
Qty: 45 CAPSULE | Refills: 1 | Status: SHIPPED | OUTPATIENT
Start: 2022-07-14 | End: 2022-07-14

## 2022-07-14 RX ORDER — INDOMETHACIN 25 MG/1
CAPSULE ORAL
Qty: 45 CAPSULE | Refills: 0 | Status: SHIPPED | OUTPATIENT
Start: 2022-07-14 | End: 2022-08-12 | Stop reason: ALTCHOICE

## 2022-07-14 NOTE — PROGRESS NOTES
Per Floresita Nolan NP:  Stefany Ba, NEERAJ Gorman LPN  Caller: Unspecified (Today,  2:39 PM)  Take indomethacin  25mg tid for total 10 days, then taper to 25 bid x 5 days, then 25 mg daily x 5 days, then d/c     NO more 12.5 mg BID x 5 days     Verbal order per provider. Order (medication, dose, route, frequency, amount, refills) repeated and verified twice.

## 2022-07-14 NOTE — PROGRESS NOTES
Identified pt with two pt identifiers(name and ). Reviewed record in preparation for visit and have obtained necessary documentation. Chief Complaint   Patient presents with    Hypertension     follow up from pericardits infusion      Vitals:    22 0946   BP: 112/74   Pulse: 82   Resp: 18   Temp: 97.6 °F (36.4 °C)   TempSrc: Temporal   SpO2: 99%   Weight: 203 lb (92.1 kg)   Height: 5' 4\" (1.626 m)   PainSc:   0 - No pain       Medications reviewed/approved by provider. Health Maintenance Review: Patient reminded of \"due or due soon\" health maintenance. I have asked the patient to contact his/her primary care provider (PCP) for follow-up on his/her health maintenance. Coordination of Care Questionnaire:  :   1) Have you been to an emergency room, urgent care, or hospitalized since your last visit? If yes, where when, and reason for visit? no       2. Have seen or consulted any other health care provider since your last visit? If yes, where when, and reason for visit? YES Dr. Jj Baltazar in [place]      Patient is accompanied by self I have received verbal consent from DelbertModeliniaaleida to discuss any/all medical information while they are present in the room.

## 2022-07-14 NOTE — PATIENT INSTRUCTIONS
Take indomethacin  25mg three times a day for total 10 days, then taper to 25 mg twice daily x 5 days, then 12.5 mg twice daily x 5 days, then stop.

## 2022-07-14 NOTE — PROGRESS NOTES
[id number] called asking for clarification regarding indomethacin RX that was sent today. indomethacin (INDOCIN) 25 mg capsule 45 Capsule 1 7/14/2022 8/13/2022    Sig - Route: Take 1 Capsule by mouth two (2) times daily (with meals) for 30 days. Take indomethacin  25mg tid for total 10 days, then taper to 25 bid x 5 days, then 12.5mg bid x 5 days, then d/c - Oral    Sent to pharmacy as: indomethacin 25 mg capsule (INDOCIN)    E-Prescribing Status: Receipt confirmed by pharmacy (7/14/2022 10:06 AM EDT)      Pharmacist needs clarification due to capsule not being able to cut in half for the last 5 days of treatment. 12.5mg bid x 5 days    Will send to NP regarding.

## 2022-08-12 ENCOUNTER — OFFICE VISIT (OUTPATIENT)
Dept: CARDIOLOGY CLINIC | Age: 37
End: 2022-08-12
Payer: COMMERCIAL

## 2022-08-12 VITALS
WEIGHT: 202 LBS | RESPIRATION RATE: 16 BRPM | HEART RATE: 83 BPM | OXYGEN SATURATION: 99 % | BODY MASS INDEX: 34.49 KG/M2 | TEMPERATURE: 97 F | DIASTOLIC BLOOD PRESSURE: 72 MMHG | HEIGHT: 64 IN | SYSTOLIC BLOOD PRESSURE: 110 MMHG

## 2022-08-12 DIAGNOSIS — I31.9 PERICARDITIS WITH EFFUSION: Primary | ICD-10-CM

## 2022-08-12 DIAGNOSIS — M35.00 SJOGREN'S SYNDROME, WITH UNSPECIFIED ORGAN INVOLVEMENT (HCC): ICD-10-CM

## 2022-08-12 DIAGNOSIS — R76.8 ANA POSITIVE: ICD-10-CM

## 2022-08-12 PROCEDURE — 99214 OFFICE O/P EST MOD 30 MIN: CPT | Performed by: INTERNAL MEDICINE

## 2022-08-12 NOTE — PROGRESS NOTES
Identified pt with two pt identifiers(name and ). Reviewed record in preparation for visit and have obtained necessary documentation. Chief Complaint   Patient presents with    Chest Pain (Angina)    Hypertension      Vitals:    22 0855   BP: 110/72   Pulse: 83   Resp: 16   Temp: 97 °F (36.1 °C)   TempSrc: Temporal   SpO2: 99%   Weight: 202 lb (91.6 kg)   Height: 5' 4\" (1.626 m)   PainSc:   0 - No pain       Medications reviewed/approved by provider. Health Maintenance Review: Patient reminded of \"due or due soon\" health maintenance. I have asked the patient to contact his/her primary care provider (PCP) for follow-up on his/her health maintenance. Coordination of Care Questionnaire:  :   1) Have you been to an emergency room, urgent care, or hospitalized since your last visit? If yes, where when, and reason for visit? no       2. Have seen or consulted any other health care provider since your last visit? If yes, where when, and reason for visit? NO      Patient is accompanied by self I have received verbal consent from Harlan August to discuss any/all medical information while they are present in the room.

## 2022-08-12 NOTE — PROGRESS NOTES
[address], [address]  [phone]  [facility], [address]     Subjective:          Ede Rogers is a 44 y.o. female is here for long follow up. PMHx significant for being DWAYNE positive, Sjogren's, axillary adenopathy, hypertension, obesity, acute pericarditis and pericardial effusion in 2016, previously rx'd colchicine, indomethacin. Last seen by Dr Ada Vigil in 3/2020:   here for symptom-based f/u--right anterior chest pain. PMHx significant for being DWAYNE positive, Sjogren's, axillary adenopathy, hypertension, obesity, acute pericarditis and pericardial effusion in 2016, previously rx'd colchicine. Seen 3/13/20 by Kenrick Ku NP, EKG with NSR, NST, no acute changes. Repeat Chest CTA 3/13/20 with no evidence pulm embolus, mod sized pericardial effusion UNCHANGED from previous. Has f/u planned with Rheumatology. Rx'd Indomethacin 25mg tid--sx improved considerably. No F, chills, cough/sputum, hemoptysis, dyspnea    Seen by Elizabeth Munoz 7/14/2022 Reports recurrent pericardial effusion. Very mild positional chest discomfort. States her plaquenil dose was tapered down and she noticed some chest discomfort after that. She know her pericardial effusion is back so she called Her PCP. The patient denies shortness of breath, orthopnea, PND, LE edema, palpitations, syncope, presyncope or fatigue.      06/22/22    ECHO ADULT COMPLETE 06/25/2022 6/25/2022    Interpretation Summary  Formatting of this result is different from the original.      Left Ventricle: Normal left ventricular systolic function with a visually estimated EF of 60 - 65%. Left ventricle size is normal. Normal wall thickness. Normal wall motion. Normal diastolic function. Pericardium: Small (<1 cm) pericardial effusion present. No indication of cardiac tamponade. Pericardial posterior effusion measures 0.9 cm. Pericardial lateral effusion measures 0.9 cm.     Signed by: Brayan Umaña MD on 2022 11:45 AM        Patient Active Problem List    Diagnosis Date Noted    Sjogren's syndrome ([facility] [address] 2020    Axillary adenopathy, left 2016    Lymphadenopathy 10/28/2015    Pericarditis with effusion 10/16/2015    Gestational HTN 10/16/2015    Obesity 10/16/2015    DWAYNE positive 10/16/2015      Sade Charles MD  Past Medical History:   Diagnosis Date    DWAYNE positive     Axillary adenopathy 2/3/2016    Hypertension in pregnancy     Obesity     Pericardial effusion     Pericarditis, acute       Past Surgical History:   Procedure Laterality Date    HX  SECTION       No Known Allergies   Family History   Problem Relation Age of Onset    Hypertension Mother     Hypertension Father     Hypertension Brother       Social History     Socioeconomic History    Marital status: SINGLE     Spouse name: Not on file    Number of children: Not on file    Years of education: Not on file    Highest education level: Not on file   Occupational History    Not on file   Tobacco Use    Smoking status: Never    Smokeless tobacco: Never   Substance and Sexual Activity    Alcohol use: Yes     Alcohol/week: 0.0 standard drinks    Drug use: Not on file    Sexual activity: Not on file   Other Topics Concern    Not on file   Social History Narrative    Not on file     Social Determinants of Health     Financial Resource Strain: Not on file   Food Insecurity: Not on file   Transportation Needs: Not on file   Physical Activity: Not on file   Stress: Not on file   Social Connections: Not on file   Intimate Partner Violence: Not on file   Housing Stability: Not on file      Current Outpatient Medications   Medication Sig    hydroxychloroquine (PLAQUENIL) 200 mg tablet Take 200 mg by mouth two (2) times a day. cholecalciferol, vitamin D3, 50 mcg (2,000 unit) tab Take 5,000 Units by mouth daily. LEVONORGESTREL (MIRENA IU) by IntraUTERine route. multivitamin (ONE A DAY) tablet Take 1 Tab by mouth daily. indomethacin (INDOCIN) 25 mg capsule 25 mg tid for total 10 days, then taper to 25 mg bid x 5 days, then 25 mg daily x 5 days, then d/c. (Patient not taking: Reported on 8/12/2022)     No current facility-administered medications for this visit. Review of Symptoms:  11 systems reviewed, negative other than as stated in the HPI    Physical ExamPhysical Exam:    Vitals:    08/12/22 0855   BP: 110/72   Pulse: 83   Resp: 16   Temp: 97 °F (36.1 °C)   TempSrc: Temporal   SpO2: 99%   Weight: 202 lb (91.6 kg)   Height: 5' 4\" (1.626 m)       Body mass index is 34.67 kg/m². General PE   General:  Well developed, in no acute distress, cooperative and alert  HEENT: No carotid bruits, no JVD, trach is midline. Neck Supple, PEERL, EOM intact. Heart:  reg rate and rhythm; normal S1/S2; no murmurs, gallops or rubs. Respiratory: Clear bilaterally x 4, no wheezing or rales  Abdomen:   Soft, non-tender, no distention, no masses. + BS. Extremities:  Normal cap refill, no cyanosis, atraumatic. No edema. Neuro: A&Ox3, speech clear, gait stable. Skin: Skin color is normal. No rashes or lesions. Non diaphoretic  Vascular: 2+ pulses symmetric in all extremities    Labs:   No results found for: CHOL, CHOLX, CHLST, CHOLV, 128402, HDL, HDLP, LDL, LDLC, DLDLP, TGLX, TRIGL, TRIGP, CHHD, CHHDX  No results found for: CPK, CPKX, CPX  Lab Results   Component Value Date/Time    Sodium 138 04/29/2019 05:34 PM    Potassium 3.8 04/29/2019 05:34 PM    Chloride 105 04/29/2019 05:34 PM    CO2 27 04/29/2019 05:34 PM    Anion gap 6 04/29/2019 05:34 PM    Glucose 68 04/29/2019 05:34 PM    BUN 14 04/29/2019 05:34 PM    Creatinine 0.64 04/29/2019 05:34 PM    BUN/Creatinine ratio 22 (H) 04/29/2019 05:34 PM    GFR est AA >60 04/29/2019 05:34 PM    GFR est non-AA >60 04/29/2019 05:34 PM    Calcium 8.5 04/29/2019 05:34 PM    Bilirubin, total 0.3 04/29/2019 05:34 PM    Alk.  phosphatase 74 04/29/2019 05:34 PM    Protein, total 8.2 04/29/2019 05:34 PM    Albumin 3.8 04/29/2019 05:34 PM    Globulin 4.4 (H) 04/29/2019 05:34 PM    A-G Ratio 0.9 (L) 04/29/2019 05:34 PM    ALT (SGPT) 20 04/29/2019 05:34 PM       EKG:  NSR      Assessment:     Assessment:        ICD-10-CM ICD-9-CM    1. Pericarditis with effusion  I31.9 423.9       2. Sjogren's syndrome, with unspecified organ involvement ([facility] [address]  M35.00 710.2       3. DWAYNE positive  R76.8 795.79           No orders of the defined types were placed in this encounter. Last seen by Dr Marilee Franco in 3/2020:   here for symptom-based f/u--right anterior chest pain. PMHx significant for being DWAYNE positive, Sjogren's, axillary adenopathy, hypertension, obesity, acute pericarditis and pericardial effusion in 2016, previously rx'd colchicine. Seen 3/13/20 by Oneta Else NP, EKG with NSR, NST, no acute changes. Repeat Chest CTA 3/13/20 with no evidence pulm embolus, mod sized pericardial effusion UNCHANGED from previous. Has f/u planned with Rheumatology. Rx'd Indomethacin 25mg tid--sx improved considerably. No F, chills, cough/sputum, hemoptysis, dyspnea    Echo/doppler--call w/ results     Continue indomethacin 25mg tid for total 10 days, then taper to 25 bid x 5 days, then 12.5mg bid x 5 days, then d/c  F/u with Rheum as planned      06/22/22    ECHO ADULT COMPLETE 06/25/2022 6/25/2022    Interpretation Summary    Left Ventricle: Normal left ventricular systolic function with a visually estimated EF of 60 - 65%. Left ventricle size is normal. Normal wall thickness. Normal wall motion. Normal diastolic function. Pericardium: Small (<1 cm) pericardial effusion present. No indication of cardiac tamponade. Pericardial posterior effusion measures 0.9 cm. Pericardial lateral effusion measures 0.9 cm. Signed by:  Pa Valencia MD on 6/25/2022 11:45 AM         Plan:     Pericarditis with effusion  Very likely due to her Sjogren's syndrome  Much improved after indomethacin taper-clinically resolved  July 2022 treated with indomethacin 25mg tid for total 10 days, then taper to 25 bid x 5 days, then 12.5mg bid x 5 days, then d/c  LVEF 60-65% small pericardial effusion per echo 6/2022    Echo in 3/2020 no pericardial effusion  Echo in 2019: Normal EF, valves ok,  Pericardium: Small circumferential pericardial effusion adjacent to left atrium and right atrium. Advised when taking indomethacin, take OTC PPI (omeprazole)  For GI ulcer prophylaxis  HTN  Controlled with current therapy    Sjogren's Disease  On Plaquenil  Followed by rheumatologist  Patient has advised her rheumatologist of recurrent pericarditis and they are adjusting meds to better treat the underlying Sjogren's syndrome    Follow-up in 6 months with nurse practitioner Naomi Saavedra, sooner as needed. Please note that the nurse practitioner helped with precharting, but did not see the patient today, and the entirety of the history, physical, and assessment and plan is mine.      Jen Ramírez MD

## 2022-08-12 NOTE — LETTER
8/12/2022    Patient: Myriam Rogers   YOB: 1985   Date of Visit: 8/12/2022     Esteban Pringle MD  [facility] [id number]  Via Fax: [phone]    Dear Esteban Pringle MD,      Thank you for referring Ms. Myriam Rogers to [facility] for evaluation. My notes for this consultation are attached. If you have questions, please do not hesitate to call me. I look forward to following your patient along with you.       Sincerely,    Delmi Vigil MD

## 2023-02-09 NOTE — PROGRESS NOTES
[address], [address]  [phone]  [facility], [address]     Subjective:          Carlos Roth is a 44 y.o. female is here for follow up. PMHx significant for being DWAYNE positive, Sjogren's, axillary adenopathy, hypertension, obesity, acute pericarditis and pericardial effusion in 2016, previously rx'd colchicine, indomethacin. Last seen by Dr Pa Cary in 3/2020:   here for symptom-based f/u--right anterior chest pain. PMHx significant for being DWAYNE positive, Sjogren's, axillary adenopathy, hypertension, obesity, acute pericarditis and pericardial effusion in 2016, previously rx'd colchicine. Seen 3/13/20 by Salvatore Calvo NP, EKG with NSR, NST, no acute changes. Repeat Chest CTA 3/13/20 with no evidence pulm embolus, mod sized pericardial effusion UNCHANGED from previous. Has f/u planned with Rheumatology. Rx'd Indomethacin 25mg tid--sx improved considerably. No F, chills, cough/sputum, hemoptysis, dyspnea    Seen by me in  7/14/2022 Reports recurrent pericardial effusion. Very mild positional chest discomfort. States her plaquenil dose was tapered down and she noticed some chest discomfort after that. She know her pericardial effusion is back so she called Her PCP. Seen by Dr Estefany Pena in 8/12/2022      Today    Feels well, no further cardiac complaints. Working fulltime at [employer]. The patient denies shortness of breath, orthopnea, PND, LE edema, palpitations, syncope, presyncope or fatigue.      06/22/22    ECHO ADULT COMPLETE 06/25/2022 6/25/2022    Interpretation Summary  Formatting of this result is different from the original.      Left Ventricle: Normal left ventricular systolic function with a visually estimated EF of 60 - 65%. Left ventricle size is normal. Normal wall thickness. Normal wall motion. Normal diastolic function. Pericardium: Small (<1 cm) pericardial effusion present. No indication of cardiac tamponade.  Pericardial posterior effusion measures 0.9 cm. Pericardial lateral effusion measures 0.9 cm. Signed by: Manuel August MD on 2022 11:45 AM        Patient Active Problem List    Diagnosis Date Noted    Sjogren's syndrome ([facility] [address] 2020    Axillary adenopathy, left 2016    Lymphadenopathy 10/28/2015    Pericarditis with effusion 10/16/2015    Gestational HTN 10/16/2015    Obesity 10/16/2015    DWAYNE positive 10/16/2015      Sade Charles MD  Past Medical History:   Diagnosis Date    DWAYNE positive     Axillary adenopathy 2/3/2016    Hypertension in pregnancy     Obesity     Pericardial effusion     Pericarditis, acute       Past Surgical History:   Procedure Laterality Date    HX  SECTION       No Known Allergies   Family History   Problem Relation Age of Onset    Hypertension Mother     Hypertension Father     Hypertension Brother       Social History     Socioeconomic History    Marital status: SINGLE     Spouse name: Not on file    Number of children: Not on file    Years of education: Not on file    Highest education level: Not on file   Occupational History    Not on file   Tobacco Use    Smoking status: Never    Smokeless tobacco: Never   Substance and Sexual Activity    Alcohol use: Yes     Alcohol/week: 0.0 standard drinks    Drug use: Not on file    Sexual activity: Not on file   Other Topics Concern    Not on file   Social History Narrative    Not on file     Social Determinants of Health     Financial Resource Strain: Not on file   Food Insecurity: Not on file   Transportation Needs: Not on file   Physical Activity: Not on file   Stress: Not on file   Social Connections: Not on file   Intimate Partner Violence: Not on file   Housing Stability: Not on file      Current Outpatient Medications   Medication Sig    loratadine (Claritin) 10 mg tablet Take 10 mg by mouth daily. hydroxychloroquine (PLAQUENIL) 200 mg tablet Take 200 mg by mouth daily.     cholecalciferol, vitamin D3, 50 mcg (2,000 unit) tab Take 5,000 Units by mouth daily. LEVONORGESTREL (MIRENA IU) by IntraUTERine route. multivitamin (ONE A DAY) tablet Take 1 Tab by mouth daily. No current facility-administered medications for this visit. Review of Symptoms:  11 systems reviewed, negative other than as stated in the HPI    Physical ExamPhysical Exam:    Vitals:    02/16/23 0934   BP: 130/84   Pulse: 91   Resp: 18   Temp: 98.4 °F (36.9 °C)   TempSrc: Temporal   SpO2: 99%   Weight: 201 lb (91.2 kg)   Height: 5' 4\" (1.626 m)         Body mass index is 34.5 kg/m². General PE   General:  Well developed, in no acute distress, cooperative and alert  HEENT: No carotid bruits, no JVD, trach is midline. Neck Supple, PEERL, EOM intact. Heart:  reg rate and rhythm; normal S1/S2; no murmurs, gallops or rubs. Respiratory: Clear bilaterally x 4, no wheezing or rales  Abdomen:   Soft, non-tender, no distention, no masses. + BS. Extremities:  Normal cap refill, no cyanosis, atraumatic. No edema. Neuro: A&Ox3, speech clear, gait stable. Skin: Skin color is normal. No rashes or lesions. Non diaphoretic  Vascular: 2+ pulses symmetric in all extremities    Labs:   No results found for: CHOL, CHOLX, CHLST, CHOLV, 600307, HDL, HDLP, LDL, LDLC, DLDLP, TGLX, TRIGL, TRIGP, CHHD, CHHDX  No results found for: CPK, CPKX, CPX  Lab Results   Component Value Date/Time    Sodium 138 04/29/2019 05:34 PM    Potassium 3.8 04/29/2019 05:34 PM    Chloride 105 04/29/2019 05:34 PM    CO2 27 04/29/2019 05:34 PM    Anion gap 6 04/29/2019 05:34 PM    Glucose 68 04/29/2019 05:34 PM    BUN 14 04/29/2019 05:34 PM    Creatinine 0.64 04/29/2019 05:34 PM    BUN/Creatinine ratio 22 (H) 04/29/2019 05:34 PM    GFR est AA >60 04/29/2019 05:34 PM    GFR est non-AA >60 04/29/2019 05:34 PM    Calcium 8.5 04/29/2019 05:34 PM    Bilirubin, total 0.3 04/29/2019 05:34 PM    Alk.  phosphatase 74 04/29/2019 05:34 PM    Protein, total 8.2 04/29/2019 05:34 PM    Albumin 3.8 04/29/2019 05:34 PM    Globulin 4.4 (H) 04/29/2019 05:34 PM    A-G Ratio 0.9 (L) 04/29/2019 05:34 PM    ALT (SGPT) 20 04/29/2019 05:34 PM       EKG:         Assessment:     Assessment:        ICD-10-CM ICD-9-CM    1. Pericarditis with effusion  I31.9 423.9       2. Sjogren's syndrome, with unspecified organ involvement ([facility]  M35.00 710.2       3. DWAYNE positive  R76.8 795.79       4. BMI 34.0-34.9,adult  Z68.34 V85.34       5. History of COVID-19  Z86.16 V12.09           Orders Placed This Encounter    loratadine (Claritin) 10 mg tablet     Sig: Take 10 mg by mouth daily. Last seen by Dr Jose Avalos in 3/2020:   here for symptom-based f/u--right anterior chest pain. PMHx significant for being DWAYNE positive, Sjogren's, axillary adenopathy, hypertension, obesity, acute pericarditis and pericardial effusion in 2016, previously rx'd colchicine. Seen 3/13/20 by Kavin Baker NP, EKG with NSR, NST, no acute changes. Repeat Chest CTA 3/13/20 with no evidence pulm embolus, mod sized pericardial effusion UNCHANGED from previous. Has f/u planned with Rheumatology. Rx'd Indomethacin 25mg tid--sx improved considerably. No F, chills, cough/sputum, hemoptysis, dyspnea    Echo/doppler--call w/ results     Continue indomethacin 25mg tid for total 10 days, then taper to 25 bid x 5 days, then 12.5mg bid x 5 days, then d/c  F/u with Rheum as planned      06/22/22    ECHO ADULT COMPLETE 06/25/2022 6/25/2022    Interpretation Summary    Left Ventricle: Normal left ventricular systolic function with a visually estimated EF of 60 - 65%. Left ventricle size is normal. Normal wall thickness. Normal wall motion. Normal diastolic function. Pericardium: Small (<1 cm) pericardial effusion present. No indication of cardiac tamponade. Pericardial posterior effusion measures 0.9 cm. Pericardial lateral effusion measures 0.9 cm. Signed by:  Cindy Kaye MD on 6/25/2022 11:45 AM         Plan:     Pericarditis with effusion, resolved  Very likely due to her Sjogren's syndrome  Much improved after indomethacin taper-clinically resolved  July 2022 treated with indomethacin 25mg tid for total 10 days, then taper to 25 bid x 5 days, then 12.5mg bid x 5 days, then d/c  LVEF 60-65% small pericardial effusion per echo 6/2022    Echo in 3/2020 no pericardial effusion  Echo in 2019: Normal EF, valves ok,  Pericardium: Small circumferential pericardial effusion adjacent to left atrium and right atrium. HTN  Controlled with current therapy    Sjogren's Disease  On Plaquenil  Followed by rheumatologist  Patient has advised her rheumatologist of recurrent pericarditis and they are adjusting meds to better treat the underlying Sjogren's syndrome      BMI 34  Discussed importance of diet and exercise - eventual goal of 30 - 60 minutes, 5-7 times a week as per AHA guideline. Goal of 10 % (20 lbs) weight loss for 6 months.      Hx Covid 19      Follow-up in 1 yr      German Elizabeth NP

## 2023-02-16 ENCOUNTER — OFFICE VISIT (OUTPATIENT)
Dept: CARDIOLOGY CLINIC | Age: 38
End: 2023-02-16
Payer: COMMERCIAL

## 2023-02-16 VITALS
DIASTOLIC BLOOD PRESSURE: 84 MMHG | OXYGEN SATURATION: 99 % | RESPIRATION RATE: 18 BRPM | TEMPERATURE: 98.4 F | WEIGHT: 201 LBS | HEIGHT: 64 IN | HEART RATE: 91 BPM | BODY MASS INDEX: 34.31 KG/M2 | SYSTOLIC BLOOD PRESSURE: 130 MMHG

## 2023-02-16 DIAGNOSIS — R76.8 ANA POSITIVE: ICD-10-CM

## 2023-02-16 DIAGNOSIS — I31.9 PERICARDITIS WITH EFFUSION: Primary | ICD-10-CM

## 2023-02-16 DIAGNOSIS — Z86.16 HISTORY OF COVID-19: ICD-10-CM

## 2023-02-16 DIAGNOSIS — M35.00 SJOGREN'S SYNDROME, WITH UNSPECIFIED ORGAN INVOLVEMENT (HCC): ICD-10-CM

## 2023-02-16 RX ORDER — LORATADINE 10 MG/1
10 TABLET ORAL DAILY
COMMUNITY

## 2023-02-16 NOTE — PROGRESS NOTES
Identified pt with two pt identifiers(name and ). Reviewed record in preparation for visit and have obtained necessary documentation. Chief Complaint   Patient presents with    Heart Problem     Pericarditis    Hypertension      Vitals:    23 0934   BP: 130/84   Pulse: 91   Resp: 18   Temp: 98.4 °F (36.9 °C)   TempSrc: Temporal   SpO2: 99%   Weight: 201 lb (91.2 kg)   Height: 5' 4\" (1.626 m)   PainSc:   0 - No pain       Medications reviewed/approved by provider. Health Maintenance Review: Patient reminded of \"due or due soon\" health maintenance. I have asked the patient to contact his/her primary care provider (PCP) for follow-up on his/her health maintenance. Coordination of Care Questionnaire:  :   1) Have you been to an emergency room, urgent care, or hospitalized since your last visit? If yes, where when, and reason for visit? no       2. Have seen or consulted any other health care provider since your last visit? If yes, where when, and reason for visit? MD Melvin for r/c. Patient is accompanied by self I have received verbal consent from Sandra Zamora to discuss any/all medical information while they are present in the room.

## 2023-05-21 RX ORDER — HYDROXYCHLOROQUINE SULFATE 200 MG/1
200 TABLET, FILM COATED ORAL DAILY
COMMUNITY
Start: 2019-05-03

## 2023-05-21 RX ORDER — LORATADINE 10 MG/1
10 TABLET ORAL DAILY
COMMUNITY

## 2024-04-10 ENCOUNTER — TRANSCRIBE ORDERS (OUTPATIENT)
Facility: HOSPITAL | Age: 39
End: 2024-04-10

## 2024-04-10 ENCOUNTER — HOSPITAL ENCOUNTER (OUTPATIENT)
Facility: HOSPITAL | Age: 39
Discharge: HOME | End: 2024-04-13
Payer: COMMERCIAL

## 2024-04-10 DIAGNOSIS — R10.12 LEFT UPPER QUADRANT PAIN: ICD-10-CM

## 2024-04-10 DIAGNOSIS — R10.12 LEFT UPPER QUADRANT PAIN: Primary | ICD-10-CM

## 2024-04-10 PROCEDURE — 74022 RADEX COMPL AQT ABD SERIES: CPT

## 2024-04-11 PROBLEM — Z86.79 HISTORY OF PERICARDITIS: Status: ACTIVE | Noted: 2024-04-11

## 2024-04-11 NOTE — PROGRESS NOTES
ASSESSMENT and PLAN  1. History of pericarditis  No clinical evidence of recurrence.  Continue to monitor.       Follow-up in 1 year.  The patient has been instructed and agrees to call our office with any issues or other concerns related to their cardiac condition(s) and/or complaint(s).      CHIEF COMPLAINT  Routine follow-up    HPI:    Jelena Joe is a 38 y.o. female here for follow-up regarding history of pericarditis.  She had pericarditis and pericardial effusion in 2016 treated with colchicine.  She had recurrent symptoms and 2022 treated with indomethacin taper.  Her symptoms resolved and she was stable at follow-up with Yue Greene NP on 2/16/2023.  She is followed by a rheumatologist  for Sjogren's syndrome and TWIN positive.  She is on Plaquenil chronically.  No cardiac issues have developed since the last visit.  Recently, she developed some epigastric indigestion and she was wondering if her pericarditis could be returning.  However, she admits that the symptoms were not consistent with what she experienced previously.  Her PCP started Zantac and her symptoms have resolved.  She denies fevers, chills, sweats, palpitations, orthopnea, PND or dyspnea.  She denies changes in exercise tolerance.    PERTINENT CARDIAC HISTORY: (Records reviewed and summarized below)  Pericarditis  ==> Echo 6/11/2019, EF 70%, valves nl, RV nl, PASP 25, small ceffusion  ==> Echo 3/27/2020, EF 70%, valves nl, RV nl, PASP 32, no effusion  ==> Echo 6/22/2022, EF 65%, valves nl, RV nl, small effusion    Hypertension  Obesity  TWIN positive  Sjogren syndrome    Past medical history, past surgical history, family history, social history, and medications were all reviewed with the patient today and updated as necessary.     Current Outpatient Medications   Medication Sig    vitamin D (VITAMIN D3) 51054 UNIT CAPS Take 1 capsule by mouth daily    hydroxychloroquine (PLAQUENIL) 200 MG tablet Take 1 tablet by mouth daily

## 2024-04-17 ENCOUNTER — OFFICE VISIT (OUTPATIENT)
Age: 39
End: 2024-04-17
Payer: COMMERCIAL

## 2024-04-17 VITALS
WEIGHT: 206 LBS | DIASTOLIC BLOOD PRESSURE: 80 MMHG | HEIGHT: 64 IN | BODY MASS INDEX: 35.17 KG/M2 | HEART RATE: 88 BPM | SYSTOLIC BLOOD PRESSURE: 130 MMHG | RESPIRATION RATE: 18 BRPM | OXYGEN SATURATION: 99 % | TEMPERATURE: 98.5 F

## 2024-04-17 DIAGNOSIS — Z86.79 HISTORY OF PERICARDITIS: Primary | ICD-10-CM

## 2024-04-17 PROCEDURE — 99213 OFFICE O/P EST LOW 20 MIN: CPT | Performed by: INTERNAL MEDICINE

## 2024-04-17 PROCEDURE — 93000 ELECTROCARDIOGRAM COMPLETE: CPT | Performed by: INTERNAL MEDICINE

## 2024-04-17 RX ORDER — CHOLECALCIFEROL (VITAMIN D3) 1250 MCG
50000 CAPSULE ORAL DAILY
COMMUNITY

## 2024-04-17 ASSESSMENT — PATIENT HEALTH QUESTIONNAIRE - PHQ9
SUM OF ALL RESPONSES TO PHQ9 QUESTIONS 1 & 2: 0
SUM OF ALL RESPONSES TO PHQ QUESTIONS 1-9: 0
2. FEELING DOWN, DEPRESSED OR HOPELESS: NOT AT ALL
1. LITTLE INTEREST OR PLEASURE IN DOING THINGS: NOT AT ALL

## 2024-04-17 NOTE — PROGRESS NOTES
Identified pt with two pt identifiers(name and ). Reviewed record in preparation for visit and have obtained necessary documentation.  Chief Complaint   Patient presents with    New Patient    Hypertension    Valvular Heart Disease      /80 (Site: Left Upper Arm, Position: Sitting, Cuff Size: Medium Adult)   Pulse 88   Temp 98.5 °F (36.9 °C) (Temporal)   Resp 18   Ht 1.626 m (5' 4\")   Wt 93.4 kg (206 lb)   SpO2 99%   BMI 35.36 kg/m²       Medications reviewed/approved by provider.      Health Maintenance Review: Patient reminded of \"due or due soon\" health maintenance. I have asked the patient to contact his/her primary care provider (PCP) for follow-up on his/her health maintenance.    Coordination of Care Questionnaire:  :   1) Have you been to an emergency room, urgent care, or hospitalized since your last visit?  If yes, where when, and reason for visit? no       2. Have seen or consulted any other health care provider since your last visit?   If yes, where when, and reason for visit?  yes - Dr. Bacon (PCP)      Patient is accompanied by self I have received verbal consent from Jelena Joe to discuss any/all medical information while they are present in the room.

## 2024-04-19 ENCOUNTER — TRANSCRIBE ORDERS (OUTPATIENT)
Facility: HOSPITAL | Age: 39
End: 2024-04-19

## 2024-04-19 ENCOUNTER — HOSPITAL ENCOUNTER (OUTPATIENT)
Facility: HOSPITAL | Age: 39
Discharge: HOME | End: 2024-04-19
Payer: COMMERCIAL

## 2024-04-19 DIAGNOSIS — R10.32 LEFT LOWER QUADRANT PAIN: Primary | ICD-10-CM

## 2024-04-19 DIAGNOSIS — R10.32 LEFT LOWER QUADRANT PAIN: ICD-10-CM

## 2024-04-19 PROCEDURE — 74177 CT ABD & PELVIS W/CONTRAST: CPT

## 2024-04-19 PROCEDURE — 6360000004 HC RX CONTRAST MEDICATION: Performed by: INTERNAL MEDICINE

## 2024-04-19 RX ADMIN — IOPAMIDOL 100 ML: 612 INJECTION, SOLUTION INTRAVENOUS at 13:58

## 2024-04-19 RX ADMIN — IOHEXOL 50 ML: 240 INJECTION, SOLUTION INTRATHECAL; INTRAVASCULAR; INTRAVENOUS; ORAL at 13:58

## 2025-04-18 ENCOUNTER — TRANSCRIBE ORDERS (OUTPATIENT)
Facility: HOSPITAL | Age: 40
End: 2025-04-18

## 2025-04-18 DIAGNOSIS — R10.84 GENERALIZED ABDOMINAL PAIN: Primary | ICD-10-CM

## 2025-04-21 ENCOUNTER — HOSPITAL ENCOUNTER (OUTPATIENT)
Facility: HOSPITAL | Age: 40
Discharge: HOME | End: 2025-04-24
Payer: COMMERCIAL

## 2025-04-21 DIAGNOSIS — R10.84 GENERALIZED ABDOMINAL PAIN: ICD-10-CM

## 2025-04-21 PROCEDURE — 6360000004 HC RX CONTRAST MEDICATION: Performed by: NURSE PRACTITIONER

## 2025-04-21 PROCEDURE — 74177 CT ABD & PELVIS W/CONTRAST: CPT

## 2025-04-21 RX ORDER — IOPAMIDOL 755 MG/ML
100 INJECTION, SOLUTION INTRAVASCULAR
Status: COMPLETED | OUTPATIENT
Start: 2025-04-21 | End: 2025-04-21

## 2025-04-21 RX ADMIN — IOHEXOL 50 ML: 240 INJECTION, SOLUTION INTRATHECAL; INTRAVASCULAR; INTRAVENOUS; ORAL at 14:15

## 2025-04-21 RX ADMIN — IOPAMIDOL 100 ML: 755 INJECTION, SOLUTION INTRAVENOUS at 15:14

## 2025-08-20 ENCOUNTER — HOSPITAL ENCOUNTER (OUTPATIENT)
Facility: HOSPITAL | Age: 40
Discharge: HOME | End: 2025-08-23
Payer: COMMERCIAL

## 2025-08-20 ENCOUNTER — TRANSCRIBE ORDERS (OUTPATIENT)
Facility: HOSPITAL | Age: 40
End: 2025-08-20

## 2025-08-20 DIAGNOSIS — M79.601 RIGHT ARM PAIN: Primary | ICD-10-CM

## 2025-08-20 DIAGNOSIS — M79.601 RIGHT ARM PAIN: ICD-10-CM

## 2025-08-20 PROCEDURE — 93971 EXTREMITY STUDY: CPT
